# Patient Record
Sex: FEMALE | Race: BLACK OR AFRICAN AMERICAN | NOT HISPANIC OR LATINO | Employment: OTHER
[De-identification: names, ages, dates, MRNs, and addresses within clinical notes are randomized per-mention and may not be internally consistent; named-entity substitution may affect disease eponyms.]

---

## 2017-02-06 ENCOUNTER — RECORDS - HEALTHEAST (OUTPATIENT)
Dept: ADMINISTRATIVE | Facility: OTHER | Age: 67
End: 2017-02-06

## 2017-06-05 ENCOUNTER — OFFICE VISIT - HEALTHEAST (OUTPATIENT)
Dept: FAMILY MEDICINE | Facility: CLINIC | Age: 67
End: 2017-06-05

## 2017-06-05 ENCOUNTER — RECORDS - HEALTHEAST (OUTPATIENT)
Dept: MAMMOGRAPHY | Facility: CLINIC | Age: 67
End: 2017-06-05

## 2017-06-05 DIAGNOSIS — Z12.31 ENCOUNTER FOR SCREENING MAMMOGRAM FOR MALIGNANT NEOPLASM OF BREAST: ICD-10-CM

## 2017-06-05 DIAGNOSIS — J45.20: ICD-10-CM

## 2017-06-05 DIAGNOSIS — Z00.00 PHYSICAL EXAM: ICD-10-CM

## 2017-06-05 DIAGNOSIS — D50.9 IRON DEFICIENCY ANEMIA, UNSPECIFIED: ICD-10-CM

## 2017-06-05 RX ORDER — FERROUS SULFATE 325(65) MG
TABLET ORAL
Status: SHIPPED | COMMUNITY
Start: 2017-06-05 | End: 2021-09-21

## 2017-06-05 ASSESSMENT — MIFFLIN-ST. JEOR: SCORE: 1355.86

## 2017-06-06 ENCOUNTER — AMBULATORY - HEALTHEAST (OUTPATIENT)
Dept: FAMILY MEDICINE | Facility: CLINIC | Age: 67
End: 2017-06-06

## 2017-06-06 ENCOUNTER — COMMUNICATION - HEALTHEAST (OUTPATIENT)
Dept: FAMILY MEDICINE | Facility: CLINIC | Age: 67
End: 2017-06-06

## 2017-06-06 DIAGNOSIS — D64.9 ANEMIA: ICD-10-CM

## 2017-06-06 LAB
CHOLEST SERPL-MCNC: 157 MG/DL
FASTING STATUS PATIENT QL REPORTED: NO
HDLC SERPL-MCNC: 47 MG/DL
LDLC SERPL CALC-MCNC: 100 MG/DL
TRIGL SERPL-MCNC: 52 MG/DL

## 2017-06-19 ENCOUNTER — AMBULATORY - HEALTHEAST (OUTPATIENT)
Dept: LAB | Facility: CLINIC | Age: 67
End: 2017-06-19

## 2017-06-19 DIAGNOSIS — D64.9 ANEMIA: ICD-10-CM

## 2017-07-12 ENCOUNTER — AMBULATORY - HEALTHEAST (OUTPATIENT)
Dept: FAMILY MEDICINE | Facility: CLINIC | Age: 67
End: 2017-07-12

## 2017-07-12 DIAGNOSIS — Z13.9 SCREENING: ICD-10-CM

## 2017-07-13 ENCOUNTER — COMMUNICATION - HEALTHEAST (OUTPATIENT)
Dept: FAMILY MEDICINE | Facility: CLINIC | Age: 67
End: 2017-07-13

## 2018-04-17 ENCOUNTER — COMMUNICATION - HEALTHEAST (OUTPATIENT)
Dept: SCHEDULING | Facility: CLINIC | Age: 68
End: 2018-04-17

## 2018-08-28 ENCOUNTER — OFFICE VISIT - HEALTHEAST (OUTPATIENT)
Dept: FAMILY MEDICINE | Facility: CLINIC | Age: 68
End: 2018-08-28

## 2018-08-28 ENCOUNTER — AMBULATORY - HEALTHEAST (OUTPATIENT)
Dept: FAMILY MEDICINE | Facility: CLINIC | Age: 68
End: 2018-08-28

## 2018-08-28 DIAGNOSIS — L72.3 SEBACEOUS CYST: ICD-10-CM

## 2018-08-28 DIAGNOSIS — Z12.11 SCREEN FOR COLON CANCER: ICD-10-CM

## 2018-08-28 DIAGNOSIS — J45.20: ICD-10-CM

## 2018-08-28 ASSESSMENT — MIFFLIN-ST. JEOR: SCORE: 1328.64

## 2018-09-14 ENCOUNTER — COMMUNICATION - HEALTHEAST (OUTPATIENT)
Dept: ADMINISTRATIVE | Facility: CLINIC | Age: 68
End: 2018-09-14

## 2018-10-30 ENCOUNTER — RECORDS - HEALTHEAST (OUTPATIENT)
Dept: ADMINISTRATIVE | Facility: OTHER | Age: 68
End: 2018-10-30

## 2018-10-31 ENCOUNTER — RECORDS - HEALTHEAST (OUTPATIENT)
Dept: ADMINISTRATIVE | Facility: OTHER | Age: 68
End: 2018-10-31

## 2018-11-02 ENCOUNTER — RECORDS - HEALTHEAST (OUTPATIENT)
Dept: ADMINISTRATIVE | Facility: OTHER | Age: 68
End: 2018-11-02

## 2018-11-26 ENCOUNTER — OFFICE VISIT - HEALTHEAST (OUTPATIENT)
Dept: FAMILY MEDICINE | Facility: CLINIC | Age: 68
End: 2018-11-26

## 2018-11-26 DIAGNOSIS — D64.9 ANEMIA, UNSPECIFIED TYPE: ICD-10-CM

## 2018-11-26 DIAGNOSIS — K80.20 CALCULUS OF GALLBLADDER WITHOUT CHOLECYSTITIS WITHOUT OBSTRUCTION: ICD-10-CM

## 2018-11-26 DIAGNOSIS — E66.813 CLASS 3 SEVERE OBESITY IN ADULT, UNSPECIFIED BMI, UNSPECIFIED OBESITY TYPE, UNSPECIFIED WHETHER SERIOUS COMORBIDITY PRESENT (H): ICD-10-CM

## 2018-11-26 DIAGNOSIS — F79 UNSPECIFIED INTELLECTUAL DISABILITIES: ICD-10-CM

## 2018-11-26 DIAGNOSIS — R32 URINARY INCONTINENCE, UNSPECIFIED TYPE: ICD-10-CM

## 2018-11-26 DIAGNOSIS — E66.01 CLASS 3 SEVERE OBESITY IN ADULT, UNSPECIFIED BMI, UNSPECIFIED OBESITY TYPE, UNSPECIFIED WHETHER SERIOUS COMORBIDITY PRESENT (H): ICD-10-CM

## 2018-11-26 DIAGNOSIS — Z00.00 MEDICARE ANNUAL WELLNESS VISIT, SUBSEQUENT: ICD-10-CM

## 2018-11-26 LAB
ALBUMIN SERPL-MCNC: 3.2 G/DL (ref 3.5–5)
ALP SERPL-CCNC: 111 U/L (ref 45–120)
ALT SERPL W P-5'-P-CCNC: 11 U/L (ref 0–45)
ANION GAP SERPL CALCULATED.3IONS-SCNC: 10 MMOL/L (ref 5–18)
AST SERPL W P-5'-P-CCNC: 13 U/L (ref 0–40)
BILIRUB SERPL-MCNC: 0.5 MG/DL (ref 0–1)
BUN SERPL-MCNC: 17 MG/DL (ref 8–22)
CALCIUM SERPL-MCNC: 9.5 MG/DL (ref 8.5–10.5)
CHLORIDE BLD-SCNC: 105 MMOL/L (ref 98–107)
CHOLEST SERPL-MCNC: 169 MG/DL
CO2 SERPL-SCNC: 27 MMOL/L (ref 22–31)
CREAT SERPL-MCNC: 0.68 MG/DL (ref 0.6–1.1)
ERYTHROCYTE [DISTWIDTH] IN BLOOD BY AUTOMATED COUNT: 15 % (ref 11–14.5)
FASTING STATUS PATIENT QL REPORTED: NO
GFR SERPL CREATININE-BSD FRML MDRD: >60 ML/MIN/1.73M2
GLUCOSE BLD-MCNC: 101 MG/DL (ref 70–125)
HCT VFR BLD AUTO: 38.7 % (ref 35–47)
HDLC SERPL-MCNC: 52 MG/DL
HGB BLD-MCNC: 12.2 G/DL (ref 12–16)
LDLC SERPL CALC-MCNC: 103 MG/DL
MCH RBC QN AUTO: 24.1 PG (ref 27–34)
MCHC RBC AUTO-ENTMCNC: 31.5 G/DL (ref 32–36)
MCV RBC AUTO: 77 FL (ref 80–100)
PLATELET # BLD AUTO: 414 THOU/UL (ref 140–440)
PMV BLD AUTO: 6.9 FL (ref 7–10)
POTASSIUM BLD-SCNC: 4.4 MMOL/L (ref 3.5–5)
PROT SERPL-MCNC: 7.6 G/DL (ref 6–8)
RBC # BLD AUTO: 5.05 MILL/UL (ref 3.8–5.4)
SODIUM SERPL-SCNC: 142 MMOL/L (ref 136–145)
TRIGL SERPL-MCNC: 69 MG/DL
WBC: 5.3 THOU/UL (ref 4–11)

## 2018-11-26 ASSESSMENT — MIFFLIN-ST. JEOR: SCORE: 1328.64

## 2018-11-29 ENCOUNTER — COMMUNICATION - HEALTHEAST (OUTPATIENT)
Dept: FAMILY MEDICINE | Facility: CLINIC | Age: 68
End: 2018-11-29

## 2019-02-26 ENCOUNTER — COMMUNICATION - HEALTHEAST (OUTPATIENT)
Dept: FAMILY MEDICINE | Facility: CLINIC | Age: 69
End: 2019-02-26

## 2019-04-11 ENCOUNTER — COMMUNICATION - HEALTHEAST (OUTPATIENT)
Dept: GERIATRIC MEDICINE | Facility: CLINIC | Age: 69
End: 2019-04-11

## 2019-09-09 ENCOUNTER — COMMUNICATION - HEALTHEAST (OUTPATIENT)
Dept: GERIATRIC MEDICINE | Facility: CLINIC | Age: 69
End: 2019-09-09

## 2020-01-14 ENCOUNTER — COMMUNICATION - HEALTHEAST (OUTPATIENT)
Dept: GERIATRIC MEDICINE | Facility: CLINIC | Age: 70
End: 2020-01-14

## 2020-02-04 ENCOUNTER — COMMUNICATION - HEALTHEAST (OUTPATIENT)
Dept: GERIATRIC MEDICINE | Facility: CLINIC | Age: 70
End: 2020-02-04

## 2020-02-10 ENCOUNTER — COMMUNICATION - HEALTHEAST (OUTPATIENT)
Dept: GERIATRIC MEDICINE | Facility: CLINIC | Age: 70
End: 2020-02-10

## 2020-02-10 RX ORDER — ACETAMINOPHEN 325 MG/1
650 TABLET ORAL EVERY 6 HOURS PRN
Status: SHIPPED | COMMUNITY
Start: 2020-02-10 | End: 2021-09-21

## 2020-02-10 ASSESSMENT — ACTIVITIES OF DAILY LIVING (ADL): DEPENDENT_IADLS:: SHOPPING;MONEY MANAGEMENT

## 2020-02-24 ENCOUNTER — COMMUNICATION - HEALTHEAST (OUTPATIENT)
Dept: GERIATRIC MEDICINE | Facility: CLINIC | Age: 70
End: 2020-02-24

## 2020-03-19 ENCOUNTER — COMMUNICATION - HEALTHEAST (OUTPATIENT)
Dept: FAMILY MEDICINE | Facility: CLINIC | Age: 70
End: 2020-03-19

## 2020-06-16 ENCOUNTER — RECORDS - HEALTHEAST (OUTPATIENT)
Dept: ADMINISTRATIVE | Facility: OTHER | Age: 70
End: 2020-06-16

## 2020-07-21 ENCOUNTER — HOSPITAL ENCOUNTER (OUTPATIENT)
Dept: MAMMOGRAPHY | Facility: CLINIC | Age: 70
Discharge: HOME | End: 2020-07-21
Attending: FAMILY MEDICINE

## 2020-07-21 DIAGNOSIS — Z12.31 VISIT FOR SCREENING MAMMOGRAM: ICD-10-CM

## 2020-08-11 ENCOUNTER — COMMUNICATION - HEALTHEAST (OUTPATIENT)
Dept: GERIATRIC MEDICINE | Facility: CLINIC | Age: 70
End: 2020-08-11

## 2020-09-15 ENCOUNTER — OFFICE VISIT - HEALTHEAST (OUTPATIENT)
Dept: FAMILY MEDICINE | Facility: CLINIC | Age: 70
End: 2020-09-15

## 2020-09-15 DIAGNOSIS — E55.9 VITAMIN D DEFICIENCY: ICD-10-CM

## 2020-09-15 DIAGNOSIS — J45.20: ICD-10-CM

## 2020-09-15 DIAGNOSIS — E66.01 MORBID OBESITY (H): ICD-10-CM

## 2020-09-15 DIAGNOSIS — R73.01 ELEVATED FASTING GLUCOSE: ICD-10-CM

## 2020-09-15 DIAGNOSIS — D50.9 IRON DEFICIENCY ANEMIA, UNSPECIFIED IRON DEFICIENCY ANEMIA TYPE: ICD-10-CM

## 2020-09-15 DIAGNOSIS — Z23 IMMUNIZATION DUE: ICD-10-CM

## 2020-09-15 DIAGNOSIS — H90.3 SENSORINEURAL HEARING LOSS (SNHL) OF BOTH EARS: ICD-10-CM

## 2020-09-15 DIAGNOSIS — Z11.59 NEED FOR HEPATITIS C SCREENING TEST: ICD-10-CM

## 2020-09-15 DIAGNOSIS — Z78.0 MENOPAUSE: ICD-10-CM

## 2020-09-15 DIAGNOSIS — Z00.00 MEDICARE ANNUAL WELLNESS VISIT, SUBSEQUENT: ICD-10-CM

## 2020-09-15 LAB
ALBUMIN SERPL-MCNC: 3.6 G/DL (ref 3.5–5)
ALP SERPL-CCNC: 118 U/L (ref 45–120)
ALT SERPL W P-5'-P-CCNC: 12 U/L (ref 0–45)
ANION GAP SERPL CALCULATED.3IONS-SCNC: 11 MMOL/L (ref 5–18)
AST SERPL W P-5'-P-CCNC: 13 U/L (ref 0–40)
BILIRUB SERPL-MCNC: 0.5 MG/DL (ref 0–1)
BUN SERPL-MCNC: 11 MG/DL (ref 8–22)
CALCIUM SERPL-MCNC: 9 MG/DL (ref 8.5–10.5)
CHLORIDE BLD-SCNC: 103 MMOL/L (ref 98–107)
CO2 SERPL-SCNC: 26 MMOL/L (ref 22–31)
CREAT SERPL-MCNC: 0.68 MG/DL (ref 0.6–1.1)
ERYTHROCYTE [DISTWIDTH] IN BLOOD BY AUTOMATED COUNT: 15.2 % (ref 11–14.5)
GFR SERPL CREATININE-BSD FRML MDRD: >60 ML/MIN/1.73M2
GLUCOSE BLD-MCNC: 83 MG/DL (ref 70–125)
HBA1C MFR BLD: 6 %
HCT VFR BLD AUTO: 39 % (ref 35–47)
HGB BLD-MCNC: 12.3 G/DL (ref 12–16)
MCH RBC QN AUTO: 24 PG (ref 27–34)
MCHC RBC AUTO-ENTMCNC: 31.6 G/DL (ref 32–36)
MCV RBC AUTO: 76 FL (ref 80–100)
PLATELET # BLD AUTO: 385 THOU/UL (ref 140–440)
PMV BLD AUTO: 7 FL (ref 7–10)
POTASSIUM BLD-SCNC: 4.5 MMOL/L (ref 3.5–5)
PROT SERPL-MCNC: 8 G/DL (ref 6–8)
RBC # BLD AUTO: 5.14 MILL/UL (ref 3.8–5.4)
SODIUM SERPL-SCNC: 140 MMOL/L (ref 136–145)
WBC: 4.6 THOU/UL (ref 4–11)

## 2020-09-15 RX ORDER — OMEGA-3-ACID ETHYL ESTERS 1 G/1
2 CAPSULE, LIQUID FILLED ORAL 2 TIMES DAILY
Status: SHIPPED | COMMUNITY
Start: 2020-09-15 | End: 2021-09-21

## 2020-09-15 RX ORDER — ALBUTEROL SULFATE 90 UG/1
AEROSOL, METERED RESPIRATORY (INHALATION)
Qty: 8.5 G | Refills: 5 | Status: SHIPPED | OUTPATIENT
Start: 2020-09-15 | End: 2021-09-21

## 2020-09-15 ASSESSMENT — MIFFLIN-ST. JEOR: SCORE: 1291.79

## 2020-09-16 ENCOUNTER — AMBULATORY - HEALTHEAST (OUTPATIENT)
Dept: OTHER | Facility: CLINIC | Age: 70
End: 2020-09-16

## 2020-09-16 LAB
25(OH)D3 SERPL-MCNC: 25 NG/ML (ref 30–80)
HCV AB SERPL QL IA: NEGATIVE

## 2020-09-17 ENCOUNTER — COMMUNICATION - HEALTHEAST (OUTPATIENT)
Dept: FAMILY MEDICINE | Facility: CLINIC | Age: 70
End: 2020-09-17

## 2020-09-17 ENCOUNTER — AMBULATORY - HEALTHEAST (OUTPATIENT)
Dept: FAMILY MEDICINE | Facility: CLINIC | Age: 70
End: 2020-09-17

## 2020-09-17 DIAGNOSIS — E55.9 VITAMIN D DEFICIENCY: ICD-10-CM

## 2020-09-23 ENCOUNTER — COMMUNICATION - HEALTHEAST (OUTPATIENT)
Dept: FAMILY MEDICINE | Facility: CLINIC | Age: 70
End: 2020-09-23

## 2020-09-29 ENCOUNTER — RECORDS - HEALTHEAST (OUTPATIENT)
Dept: ADMINISTRATIVE | Facility: OTHER | Age: 70
End: 2020-09-29

## 2020-10-14 ENCOUNTER — COMMUNICATION - HEALTHEAST (OUTPATIENT)
Dept: SCHEDULING | Facility: CLINIC | Age: 70
End: 2020-10-14

## 2020-10-20 ENCOUNTER — RECORDS - HEALTHEAST (OUTPATIENT)
Dept: ADMINISTRATIVE | Facility: OTHER | Age: 70
End: 2020-10-20

## 2020-10-20 ENCOUNTER — RECORDS - HEALTHEAST (OUTPATIENT)
Dept: BONE DENSITY | Facility: CLINIC | Age: 70
End: 2020-10-20

## 2020-10-20 DIAGNOSIS — Z78.0 ASYMPTOMATIC MENOPAUSAL STATE: ICD-10-CM

## 2020-10-26 ENCOUNTER — COMMUNICATION - HEALTHEAST (OUTPATIENT)
Dept: FAMILY MEDICINE | Facility: CLINIC | Age: 70
End: 2020-10-26

## 2020-12-16 ENCOUNTER — COMMUNICATION - HEALTHEAST (OUTPATIENT)
Dept: FAMILY MEDICINE | Facility: CLINIC | Age: 70
End: 2020-12-16

## 2020-12-22 ENCOUNTER — AMBULATORY - HEALTHEAST (OUTPATIENT)
Dept: NURSING | Facility: CLINIC | Age: 70
End: 2020-12-22

## 2021-01-18 ENCOUNTER — COMMUNICATION - HEALTHEAST (OUTPATIENT)
Dept: GERIATRIC MEDICINE | Facility: CLINIC | Age: 71
End: 2021-01-18

## 2021-01-25 ENCOUNTER — COMMUNICATION - HEALTHEAST (OUTPATIENT)
Dept: GERIATRIC MEDICINE | Facility: CLINIC | Age: 71
End: 2021-01-25

## 2021-03-03 ENCOUNTER — RECORDS - HEALTHEAST (OUTPATIENT)
Dept: FAMILY MEDICINE | Facility: CLINIC | Age: 71
End: 2021-03-03

## 2021-03-03 DIAGNOSIS — Z12.31 VISIT FOR SCREENING MAMMOGRAM: ICD-10-CM

## 2021-03-16 ENCOUNTER — OFFICE VISIT - HEALTHEAST (OUTPATIENT)
Dept: FAMILY MEDICINE | Facility: CLINIC | Age: 71
End: 2021-03-16

## 2021-03-16 DIAGNOSIS — F79 UNSPECIFIED INTELLECTUAL DISABILITIES: ICD-10-CM

## 2021-03-16 DIAGNOSIS — R07.89 ATYPICAL CHEST PAIN: ICD-10-CM

## 2021-03-16 LAB
ATRIAL RATE - MUSE: 72 BPM
DIASTOLIC BLOOD PRESSURE - MUSE: NORMAL
INTERPRETATION ECG - MUSE: NORMAL
P AXIS - MUSE: 30 DEGREES
PR INTERVAL - MUSE: 182 MS
QRS DURATION - MUSE: 80 MS
QT - MUSE: 370 MS
QTC - MUSE: 405 MS
R AXIS - MUSE: 67 DEGREES
SYSTOLIC BLOOD PRESSURE - MUSE: NORMAL
T AXIS - MUSE: 31 DEGREES
VENTRICULAR RATE- MUSE: 72 BPM

## 2021-03-23 ENCOUNTER — AMBULATORY - HEALTHEAST (OUTPATIENT)
Dept: NURSING | Facility: CLINIC | Age: 71
End: 2021-03-23

## 2021-03-30 ENCOUNTER — COMMUNICATION - HEALTHEAST (OUTPATIENT)
Dept: ADMINISTRATIVE | Facility: CLINIC | Age: 71
End: 2021-03-30

## 2021-04-12 ENCOUNTER — AMBULATORY - HEALTHEAST (OUTPATIENT)
Dept: NURSING | Facility: CLINIC | Age: 71
End: 2021-04-12

## 2021-05-03 ENCOUNTER — AMBULATORY - HEALTHEAST (OUTPATIENT)
Dept: NURSING | Facility: CLINIC | Age: 71
End: 2021-05-03

## 2021-05-27 NOTE — PROGRESS NOTES
[facility] Care Coordination Contact      Member became effective with  [facility] on 4/1/19 with Dahlia MSC+.  Previous Health Plan: N/A  Previous Care System: MA Termed.  Previously with .  Previous care coordinators name and number: Nadine Higgins Type: DD  Last MMIS Entry: Date 09/25/18 and Type 02 PERS ASSMT  MMIS visit date if different from above: n/a    UTF received: No: MA Termed.  Previously with .    Anjali Raines  Care Management Specialist  [facility]  [phone]

## 2021-05-28 ASSESSMENT — ASTHMA QUESTIONNAIRES: ACT_TOTALSCORE: 16

## 2021-05-28 NOTE — PROGRESS NOTES
[facility] Care Coordination Contact    4/23/2019:   Member was on our enrollment previously and had HRA completed on 9/25/18. Her MA termed on 11/30/18.   LM for Blanca Olmstead, guardian at [facility] informing her due to the MA lapse member would be due for another HRA.   Requested call back.     TC to member's group home. They stated that member is doing well and hasn't had any major changes. They are fine with me coming and visiting, but would like me to speak to the guardian first.     Nadine Higgins, [facility]  [phone]

## 2021-05-30 VITALS — WEIGHT: 202 LBS | BODY MASS INDEX: 40.72 KG/M2 | HEIGHT: 59 IN

## 2021-06-01 VITALS — BODY MASS INDEX: 39.51 KG/M2 | WEIGHT: 196 LBS | HEIGHT: 59 IN

## 2021-06-01 NOTE — PROGRESS NOTES
[facility] Care Coordination Contact    No longer active with [facility] community case management effective 05/31/2019.  Reason for community disenrollment: BALJINDER Abebe  [facility]  [phone]

## 2021-06-02 ENCOUNTER — RECORDS - HEALTHEAST (OUTPATIENT)
Dept: ADMINISTRATIVE | Facility: CLINIC | Age: 71
End: 2021-06-02

## 2021-06-02 VITALS — BODY MASS INDEX: 39.51 KG/M2 | WEIGHT: 196 LBS | HEIGHT: 59 IN

## 2021-06-04 VITALS
TEMPERATURE: 98.5 F | BODY MASS INDEX: 40.35 KG/M2 | DIASTOLIC BLOOD PRESSURE: 76 MMHG | HEART RATE: 73 BPM | WEIGHT: 192.25 LBS | OXYGEN SATURATION: 94 % | HEIGHT: 58 IN | SYSTOLIC BLOOD PRESSURE: 140 MMHG

## 2021-06-05 VITALS
TEMPERATURE: 98.1 F | OXYGEN SATURATION: 98 % | HEART RATE: 56 BPM | DIASTOLIC BLOOD PRESSURE: 80 MMHG | WEIGHT: 187.31 LBS | BODY MASS INDEX: 39.15 KG/M2 | SYSTOLIC BLOOD PRESSURE: 132 MMHG

## 2021-06-05 NOTE — PROGRESS NOTES
[facility] Care Coordination Contact    TC from Mona Alba's ILS worker [phone]. They were going through the mail and saw my UTR letter.   Mona is open to a visit, but would prefer it to be at a time when Anjali is also present.   Anjali comes every Monday and Tuesday from 9-12.   Mona reported that she stopped going to [facility] and has been going to [facility]. She is looking for a new provider now though and isn't sure where she will go.   We scheduled a visit for Monday at 9:30am.      BALJINDER Gonsales  [facility]  [phone]

## 2021-06-05 NOTE — PROGRESS NOTES
[facility] Care Coordination Contact    Attempted to call Mona on 1/13 and again on 1/14 and left messages. No response.   Emergency contact on file with Epic is no longer an active number.     TC to [facility] DD . She stated that Mona can be very difficult to reach and rarely answers her phone. Mona is pretty healthy and may not want care coordination involved.   She is on the DD waiver, but due to some screening entry issues has not been updated in MMIS.   She provided me updated information on her guardian from [facility] [phone].     Nadine Higgins, [facility]  [phone]

## 2021-06-05 NOTE — PROGRESS NOTES
"[facility] Care Coordination Contact    Per CC, mailed client an \"Unable to Contact\" letter.    Per CC, continues to be unable to contact member for assessment.    Anjali Raines  Care Management Specialist  [facility]  [phone]    "

## 2021-06-05 NOTE — PROGRESS NOTES
[facility] Care Coordination Contact    Member became effective with [facility] on 01/01/2020 with Dahlia MSC+.  Previous Health Plan: MA/Fee For Service  Previous Care System: N/A  Previous care coordinators name and number: Nadine Higgins   [place] Type: DD  Last MMIS Entry: Date 09/25/18 and Type 02 PERS ASSMT  UTF received: No: Requested on No other Assessment on file.    Anjali Raines  Care Management Specialist  [facility]  [phone]

## 2021-06-05 NOTE — PROGRESS NOTES
[facility] Care Coordination Contact    Left message for Mona to call back. I explained my role.     Left message for Mona's guardian Priscila informing her of my role and inability to contact Mona.     Requested UTR letter mailed.     Nadine Higgins [facility]  [phone]

## 2021-06-06 NOTE — PROGRESS NOTES
[facility] Care Coordination Contact    Received after visit chart from care coordinator.  Completed following tasks: Mailed copy of care plan to client.  Updated services in access as needed.     Provider Signature - No POC Shared:  Member indicates that they do not want their POC shared with any EW providers.    Anjali Raines  Care Management Specialist  [facility]  [phone]

## 2021-06-06 NOTE — PROGRESS NOTES
[facility] Care Coordination Contact  [facility] Home Visit Assessment     Home visit for Health Risk Assessment with Mona Barnett completed on 2/10/2020.    Type of residence:: Apartment  Current living arrangement:: I live alone     Assessment completed with:: Patient, Care Team Member, Other(ILS worker)    Current Care Plan  Member currently receiving the following home care services: None   Member currently receiving the following community resources: Mona is on the DD waiver and receives ILS services and transportation through that program. Her sister would like her to receive [employer]. I informed her that would inform her DD worker of that request.     Medication Review  Medication reconciliation completed in Epic: YES  Medication set-up & administration: Independent-does not set up  Self-administers medications  Medication Risk Assessment Medication (1 or more, place referral to MTM):  N/A: No risk factors identified  MTM Referral Placed: No: No risk factors idenified    Mental/Behavioral Health   Depression Screening: See PHQ assessment flowsheet.   Mental health DX:: Yes   Mental health DX how managed:: None  Mental Health Diagnosis: Yes: F32.3  Mental Health Services: None: No further intervention needed at this time.    Falls Assessment:   Fallen 2 or more times in the past year?: No   Any fall with injury in the past year?: No    ADL/IADL Dependencies:   Dependent ADLs:: Independent  Dependent IADLs:: Shopping, Money Management    [facility] sponsored benefits: Shared information re: Silver Sneakers/gym memberships, ASA, Calcium +D.    PCA Assessment completed at visit: N/A    Elderly Waiver Eligibility: Mona is on the DD waiver and not eligible for EW.    Care Plan & Recommendations:   Met with Mnoa and her ILS worker Rufus Alba's last day with Mona is tomorrow and they are currently working on finding a new ILS worker.   The ILS workers help coordinate Mona's medical care and  accompany her to her appointments. Mona is currently looking for a new primary physician. She is interested in [facility].  She is open to having a wellness exam and getting updated on her preventative cares.     See [facility] for detailed assessment information.    Follow-Up Plan: Member informed of future contact, plan to f/u with member with a 6 month telephone assessment.  Contact information shared with member and family, encouraged member to call with any questions or concerns at any time.    Nadine Higgins, [facility]  [phone]

## 2021-06-07 NOTE — TELEPHONE ENCOUNTER
Left message to call back for: Patient  Information to relay to patient: Due to COVID19 we are taking precautions and canceling all appointments at this time. We are trying to limit possible exposure coming in and out of the clinic. Please help patient r/s their appointment for the summer until further notice.     NR/CMA

## 2021-06-10 NOTE — PROGRESS NOTES
[facility] Care Coordination Contact    Called member to complete six month assessment and left a message requesting a return call.    Secure e-mailed Mona's ILS agency ([employer])  and asked for an update. I inquired on wether the ILS worker could assist Mona with calling me at their next visit.     Nadine Higgins, [facility]  [phone]

## 2021-06-10 NOTE — PROGRESS NOTES
[facility] Care Coordination Contact    Received message from Mona's ILS worker Guillermina ([phone]).  She is with Mona on Mondays and Tuesdays from 9-1, so suggested I call during that time.     LM for Guillermina informing her that I would call next Monday morning.     Nadine Higgins [facility]  [phone]

## 2021-06-11 NOTE — PROGRESS NOTES
Assessment and Plan:     1. Medicare annual wellness visit, subsequent      2. Intermittent asthma with reliever use up to twice per week, uncomplicated  Asthma control test and Asthma Action Plan done   - albuterol (PROAIR HFA) 90 mcg/actuation inhaler; INHALE 2 PUFFS BY MOUTH EVERY 4 HOURS AS NEEDED  Dispense: 8.5 g; Refill: 5    3. Iron deficiency anemia, unspecified iron deficiency anemia type  Monitor with   - HM2(CBC w/o Differential)    4. Sensorineural hearing loss (SNHL) of both ears  Has hearing aids - question whether they are working correctly  - Ambulatory referral to Audiology    5. Elevated fasting glucose  evaluate with   - Glycosylated Hemoglobin A1c  - Comprehensive Metabolic Panel    6. Morbid obesity (H)  She is working on exercise with her ILS worker - this continues to be important    7. Vitamin D deficiency  Check level  - Vitamin D, Total (25-Hydroxy)    8. Immunization due  - Influenza,Quad,High Dose,PF 65 YR+    9. Menopause  Screen for osteopenia/osteoporosis with  - DXA Bone Density Scan; Standing    10. Need for hepatitis C screening test  Recommended screening for baby boomers  - Hepatitis C Antibody (Anti-HCV)      The patient's current medical problems were reviewed.      The following health maintenance schedule was reviewed with the patient and provided in printed form in the after visit summary:   Health Maintenance   Topic Date Due     HEPATITIS C SCREENING  1950     ZOSTER VACCINES (1 of 2) 10/22/2002     DXA SCAN  10/22/2017     Asthma Control Test  09/15/2021     MEDICARE ANNUAL WELLNESS VISIT  09/15/2021     PNEUMOCOCCAL IMMUNIZATION 65+ LOW/MEDIUM RISK (2 of 2 - PCV13) 09/15/2021     FALL RISK ASSESSMENT  09/15/2021     ASTHMA ACTION PLAN  09/16/2021     MAMMOGRAM  07/21/2022     LIPID  11/26/2023     ADVANCE CARE PLANNING  09/16/2025     COLORECTAL CANCER SCREENING  10/31/2028     TD 18+ HE  09/15/2030     INFLUENZA VACCINE RULE BASED  Completed     HEPATITIS B  "VACCINES  Aged Out     - hepatitis C test done today   - she or ILS will have to look into coverage of shingles vaccine    Subjective:   Chief Complaint: Mona Barnett is an 69 y.o. female here for an Annual Wellness visit.   HPI:      Mona is a new patient to me - she used to see Dr. Monroy, who is no longer at this clinic.    She has a diagnosis of \"intellectual disabilities.\" She lives in a home on her own, but has an [facility] worker - Guillermina, who comes with her today . Guillermina comes on Mondays and Tuesdays - goal of exercising - they do go walking around [facility] or [place]. Mona does not prepare meals for  herself at home.  Guillermina also help clean house. Mona says her neighbors are nice and her sister Sanchez lives in [place] and is also supportive. Guillermina just started seeing her at the end or March or April    Mona works in the [employer] at [employer].  She notes slipping and falling on a wet floor, no injury.  Guillermina mentions support staff that follow her for employment    Mona has hearing loss, wears hearing aids, but still seems to have trouble hearing. Guillermina is requesting a referral to [facility] in [place] for an evaluation, as Mona has been seen there before    WE went over her problem list    Intermittent Asthma - not on her problem list but she dos have albuterol prescribe with diagnosis of intermittent asthma.  Mona says this is helpful when they go for longer walks and she is short of breath    Iron deficiency anemia- she take iron supplementation; She does not take a daily multivitamin   Source of deficiency is not listed   Lab Results   Component Value Date    HGB 12.3 09/15/2020    since her hemoglobin is in normal range ON supplementation, discussed she should continue this    Macular Degeneration- Guillermina mentions this recent diagnosis - we do not have notes from her eye doctor - will need these before adding to problem list. Mona is taking gummies - fish oil or omega 3 " and also using off -brand wetting eye drops  On dry eye drops - off brand     Other: urinary incontinence, hidradenitis suppurativa - not mentioned/discussed today     needs to update contacts    Review of Systems:   Please see above.  The rest of the review of systems are negative for all systems.          Patient Care Team:  Anjali Navarro MD as PCP - General (Family Medicine)  Roderick Monroy MD as Assigned PCP  Nadine Higgins SW as Lead Care Coordinator (Primary Care - CC)     Patient Active Problem List   Diagnosis     Cholelithiasis     Feelings Of Urinary Urgency     Iron Deficiency     Limb Pain     Intellectual Disabilities     Hearing Loss     Hidradenitis Suppurativa     Urinary incontinence     Morbid obesity (H)     Past Medical History:   Diagnosis Date     Cholelithiasis      Hearing loss      Hidradenitis suppurativa      Iron deficiency      Limb pain      Mild intellectual disabilities (CODE)       Past Surgical History:   Procedure Laterality Date     BREAST CYST EXCISION Right     Had cyst removed many years ago     ND REMOVAL GALLBLADDER      Description: Cholecystectomy;  Recorded: 04/15/2009;     ND TOTAL ABDOM HYSTERECTOMY      Description: Hysterectomy;  Recorded: 04/15/2009;      Family History   Problem Relation Age of Onset     Breast cancer Neg Hx           Tobacco Use      Smoking status: Never Smoker      Smokeless tobacco: Never Used   reports no history of alcohol use.    Current Outpatient Medications   Medication Sig Dispense Refill     acetaminophen (TYLENOL) 325 MG tablet Take 650 mg by mouth every 6 (six) hours as needed for pain.       albuterol (PROAIR HFA) 90 mcg/actuation inhaler INHALE 2 PUFFS BY MOUTH EVERY 4 HOURS AS NEEDED 8.5 g 5     ferrous sulfate 325 (65 FE) MG tablet take 1 tablet (325 mg) by oral route 3 times per day       omega-3 acid ethyl esters (LOVAZA) 1 gram capsule Take 2 g by mouth 2 (two) times a day.       No current  "facility-administered medications for this visit.       Objective:   Vital Signs:   Visit Vitals  /76 (Patient Site: Right Arm, Patient Position: Sitting, Cuff Size: Adult Regular)   Pulse 73   Temp 98.5  F (36.9  C) (Oral)   Ht 4' 10\" (1.473 m)   Wt 192 lb 4 oz (87.2 kg)   SpO2 94%   BMI 40.18 kg/m           VisionScreening:  No exam data present     PHYSICAL EXAM  General appearance - alert, well appearing, and in no distress; obese; wears bilateral hearing aids  Mental status - very pleasant - normal mood, behavior, speech, dress, motor activity, and thought processes  Eyes - pupils equal and reactive, extraocular eye movements intact, funduscopic exam normal, discs flat and sharp  Ears - Left TM's and external ear canals normal; right side occluded with wax  Mouth - mucous membranes moist, pharynx normal without lesions  Neck - supple, no significant adenopathy, carotids upstroke normal bilaterally, no bruits, thyroid exam: thyroid is normal in size without nodules or tenderness  Chest - clear to auscultation, no wheezes, rales or rhonchi, symmetric air entry  Heart - normal rate, regular rhythm, normal S1, S2, no murmurs, rubs, clicks or gallops  Abdomen - soft, nontender, nondistended, no masses or organomegaly  Breasts - breasts appear normal, no suspicious masses, no skin or nipple changes or axillary nodes  Neurological - alert, oriented, normal speech, no focal findings or movement disorder noted, DTR's normal and symmetric  Extremities - peripheral pulses normal, no pedal edema, no clubbing or cyanosis; fungal changes on most toenails  Skin - no rashes or worrisome lesions          Assessment Results 9/15/2020   Activities of Daily Living No help needed   Instrumental Activities of Daily Living 2-4 - Moderate impairment   Get Up and Go Score 12 seconds or more   Mini Cog Total Score 0   Some recent data might be hidden     A Mini-Cog score of 0-2 suggests the possibility of dementia, score of 3-5 " suggests no dementia    Mona give permission to fax copy of progress notes and labs to :     FAX: [phone]  Attn Guillermina      Identified Health Risks:     She is at risk for lack of exercise and has been provided with information to increase physical activity for the benefit of her well-being.  The patient was provided with written information regarding signs of hearing loss.  Information on urinary incontinence and treatment options given to patient.  She is at risk for falling and has been provided with information to reduce the risk of falling at home.  Information regarding advance directives (living flores), including where she can download the appropriate form, was provided to the patient via the AVS.       The patient was provided with appropriate referrals to address her memory problem.

## 2021-06-11 NOTE — PROGRESS NOTES
Assessment:      Healthy female exam.    History of anemia  Status post hysterectomy and bilateral oophorectomy  Obesity  Vitamin D deficiency  Reactive airway disease     Plan:       Routine lab work will be done today.  Pap smear is not required as patient is status post hysterectomy.  Mammogram will be updated today.  Patient will have Hemoccults done as well.  She was instructed in use of this.  She states she has had a colonoscopy but does not remember when.  We do not have a record of that.  Albuterol inhaler will be refilled for her.  Hemoglobin will be followed due to patient's history of anemia.  She will try to become more aerobically active and start a weight loss type diet.  I instructed her in the importance of losing weight.     Subjective:      Mona Barnett is a 66 y.o. female who presents for an annual exam. The patient is not sexually active. The patient participates in regular exercise: no. The patient reports that there is not domestic violence in her life.  Overall, she feels well.  She does not follow any specific diet.  She does not exercise or walk around much.  She will get some swelling in her legs occasionally.  She denies significant pain in the joints or back.  She thinks she had a colonoscopy years ago but does not remember when.  Her last mammogram was 1 year ago.  She is due for that.    Healthy Habits:   Regular Exercise: No  Sunscreen Use: No  Healthy Diet: No  Dental Visits Regularly: Yes  Seat Belt: Yes  Sexually active: No  Self Breast Exam Monthly:No  Hemoccults: Yes  Flex Sig: No  Colonoscopy: No  Lipid Profile: Yes  Glucose Screen: Yes      Immunization History   Administered Date(s) Administered     Hep A, historic 01/27/2010, 02/15/2011     Influenza [id number], 01/27/2010     Influenza, seasonal,quad inj 6-35 mos 01/27/2010     Tdap 01/06/2009     Immunization status: up to date and documented.    No exam data present    Gynecologic History  No LMP recorded. Patient has had a  hysterectomy.  Last mammogram: May 2015. Results were: normal      OB History    Para Term  AB Living     0      SAB TAB Ectopic Multiple Live Births                    Current Outpatient Prescriptions   Medication Sig Dispense Refill     ferrous sulfate 325 (65 FE) MG tablet take 1 tablet (325 mg) by oral route 3 times per day       albuterol (PROAIR HFA) 90 mcg/actuation inhaler INHALE 2 PUFFS BY MOUTH EVERY 4 HOURS AS NEEDED 8.5 g 0     cholecalciferol, vitamin D3, (VITAMIN D3) 4,000 unit cap Take 1 tab daily 90 capsule 3     No current facility-administered medications for this visit.      No past medical history on file.  Past Surgical History:   Procedure Laterality Date     BREAST CYST EXCISION Right     Had cyst removed many years ago     MA REMOVAL GALLBLADDER      Description: Cholecystectomy;  Recorded: 04/15/2009;     MA TOTAL ABDOM HYSTERECTOMY      Description: Hysterectomy;  Recorded: 04/15/2009;     Review of patient's allergies indicates no known allergies.  Family History   Problem Relation Age of Onset     Breast cancer Neg Hx      Social History     Social History     Marital status: Single     Spouse name: N/A     Number of children: N/A     Years of education: N/A     Occupational History     Not on file.     Social History Main Topics     Smoking status: Never Smoker     Smokeless tobacco: Never Used     Alcohol use No     Drug use: No     Sexual activity: No     Other Topics Concern     Not on file     Social History Narrative       Review of Systems  Review of Systems   Constitutional: Negative.  Negative for fatigue and fever.   HENT: Negative.  Negative for congestion.    Eyes: Negative.    Respiratory: Negative.  Negative for cough and shortness of breath.    Cardiovascular: Negative.  Negative for chest pain.   Gastrointestinal: Positive for constipation.   Endocrine: Negative.  Negative for polydipsia and polyuria.   Genitourinary: Negative.  Negative for dysuria and  "frequency.   Musculoskeletal: Negative.    Skin: Negative.    Allergic/Immunologic: Negative.    Neurological: Negative.  Negative for headaches.   Hematological: Negative.    Psychiatric/Behavioral: Negative.              Objective:         Vitals:    06/05/17 1910   BP: 132/70   Pulse: 80   Resp: 16   Temp: 97.9  F (36.6  C)   TempSrc: Oral   Weight: 202 lb (91.6 kg)   Height: 4' 11.25\" (1.505 m)     Body mass index is 40.46 kg/(m^2).    Physical  Physical Exam   Constitutional: She is oriented to person, place, and time. She appears well-developed and well-nourished. No distress.   HENT:   Right Ear: External ear normal.   Left Ear: External ear normal.   Nose: Nose normal.   Mouth/Throat: Oropharynx is clear and moist.   Eyes: Conjunctivae and EOM are normal. Pupils are equal, round, and reactive to light.   Neck: Normal range of motion. Neck supple. No thyromegaly present.   Cardiovascular: Normal rate, regular rhythm and normal heart sounds.    No murmur heard.  Pulmonary/Chest: Effort normal and breath sounds normal. She has no wheezes. She has no rales.   Abdominal: Soft. Bowel sounds are normal. She exhibits no mass. There is no tenderness.   Abdomen is obese   Musculoskeletal: Normal range of motion. She exhibits no edema or tenderness.   Lymphadenopathy:     She has no cervical adenopathy.   Neurological: She is alert and oriented to person, place, and time. She has normal reflexes. No cranial nerve deficit.   Skin: Skin is warm and dry. No rash noted.   Psychiatric: She has a normal mood and affect.          "

## 2021-06-11 NOTE — PROGRESS NOTES
[facility] Care Coordination Contact      [facility] Six-Month Telephone Assessment    6 month telephone assessment completed on 08/31/20 with help of [place] ILS worker Guillermina.    ER visits: Yes -  Urgency room [place]   Hospitalizations: No  TCU stays: No  Significant health status changes: None  Falls/Injuries: No  ADL/IADL changes: No  Changes in services: No    Caregiver Assessment follow up:  N/A    Goals: See POC in chart for goal progress documentation.      Will see member in 6 months for an annual health risk assessment.   Encouraged member to call CC with any questions or concerns in the meantime.     BALJINDER Gonsales  [facility]  [phone]

## 2021-06-11 NOTE — TELEPHONE ENCOUNTER
[facility] called to say Guillermina from pt Assisted living?  Was upset because they had not received the notes from pt Wellness exam last week.  Fax number given as [phone].  Faxing notes now.  Thanks.

## 2021-06-12 NOTE — TELEPHONE ENCOUNTER
Caller is patient's Independent tLIving Counselor who supervises patients health appointments and care   Requesting to schedule shingles vaccine if patient has not had it; EMR does not reveal she has  had it   Inquiring about colonoscopy history and advised had  in 2018  Concerns for pap tests; advised that patient has had total abdominal hysterectomy per history and is over age 65 an it is no longer required  Transferred to  to schedule nurse only for shingles vaccine    Ny Prajapati RN  FNA    Additional Information    Health Information question, no triage required and triager able to answer question    Protocols used: INFORMATION ONLY CALL-A-AH

## 2021-06-13 NOTE — TELEPHONE ENCOUNTER
"Thank you - Order placed - please make sure she has checked coverage and is \"allowed\" to get it at a clinic vs. Pharmacy   Depending on her insurance coverage.  Medicare alone covers under prescriptions, so that means pharmacy.  thanks  "

## 2021-06-13 NOTE — TELEPHONE ENCOUNTER
Patient to receive first Shingrix vaccine at [facility] clinic 12/22/20.      Will Provider place order if indicated?  Thank you.

## 2021-06-14 NOTE — PROGRESS NOTES
[facility] Care Coordination Contact  [facility] CCDB Assessment   (for members on other waivers)    Home visit for Health Risk Assessment with Mona Barnett completed on 1/18/2021.   Visit completed via phone due to COVID-19.      Type of residence:: Apartment - handicap accessible  Current living arrangement:: I live alone      Assessment completed with: Patient, Care Team Member, Other(ILS worker Guillermina)     Current Care Plan  Member currently receiving the following home care services:  N/A   Member currently receiving the following community resources: Transportation Services, Meals on Wheels, Lifeline , ILS services (all services provided through DD waiver.        Medication Review  Medication reconciliation completed in Epic: YES  Medication set-up & administration: Independent-does not set up  Self-administers medications- ILS worker stated that the 2 days she is with Mona she encourages her to take medication.   Medication Risk Assessment Medication (1 or more, place referral to MTM):  N/A: No risk factors identified  MTM Referral Placed: No: No risk factors idenified    Mental/Behavioral Health   PHQ-2 Total Score: 0       Mental health DX:: Yes   Mental health DX how managed:: None    Falls Assessment:   Fallen 2 or more times in the past year?: No   Any fall with injury in the past year?: No     ADL/IADL Dependencies:   Dependent ADLs:: Independent  Dependent IADLs:: Cleaning, Cooking, Shopping, Money Management, Transportation     [facility] sponsored benefits: Shared information re: Silver Sneakers/gym memberships, ASA, Calcium +D.     PCA Assessment completed at visit: Not Applicable       Elderly Waiver Eligibility: No - does not meet criteria- Currently on DD waiver      Care Plan &Recommendations: Mona stated that her health has been stable over the past year. Her only concern was with some chest pain she has been experiencing at times. She plans to address that with her PCP  at her appointment next month.   Mona continues to work at [employer] and goes to the gym weekly with her ILS worker. She tries to stay active and busy.      DD Care Plan/ISP requested from waiver .      Follow-Up Plan: Member informed of future contact, plan to f/u with member with a 6 month telephone assessment.  Contact information shared with member and family, encouraged member to call with any questions or concerns at any time.     Nadine Higgins, [facility]  [phone]

## 2021-06-14 NOTE — PROGRESS NOTES
[facility] Care Coordination Contact    Mailed copy of care plan to client.  Updated services in access as needed.     Provider Signature - No POC Shared:  Member indicates that they do not want their POC shared with any [facility] providers.    Mailed POC Signature page to member w/ self-stamped envelope for return.    Anjali Raines  Care Management Specialist  [facility]  [phone]

## 2021-06-15 NOTE — PROGRESS NOTES
Assessment and Plan    1. Atypical chest pain - hard for her to quantify or qualify  - Electrocardiogram Perform and Read - normal   - discussed probably musculoskeletal.  However urged that if she has severe left sided chest pain, use her emergency call necklace, and if she has recurrent pain we need to re-evaluate    2. Intellectual Disabilities  Complex issues around guardianship - see letter below    - AMB REFERRAL TO MENTAL HEALTH AND ADDICTION  - Adult (18+); Assessment and Testing; Neuropsychological Testing; [facility] [phone]; We will contact you to schedule the appointment or please call with any questions    To whom it may concern:     I am the Primary Care Provider for the above named patient.  I have seen Ms. Barnett twice first on the day she establish care with me at a Medicare Annual Wellness visit, and today.  [facility], and I have access to her records and notes of her previous PCP Dr. Monroy.  She is generally healthy, he main other issues being hearing  loss, mild intermittent asthma, and intellectual disabilities - the latter is the diagnosis on her chart  My understanding is that consideration is being given as to whether Mona can be her own guardian,  but continue to have support as needed form ILS worker and Rep Payee and other personnel  In ways, Mona IS independent: she lives on her own, takes public transportation to work, uses cash as needed.  She has Rep Payee for bills and an ILS worker who helps her with cleaning and taking to appointments.    On the other hand, I am worried about her vulnerability  o She is a poor historian, at least for medical issues  o She does not give descriptions with any depth of detail  o I have observed -  and her ILS worker also notes - that she may nod or give short answers to questions she may not understand  o Her ILS worker says she may not answer emails or  "phone calls.    o She has exhibited some poor judgment (her ILS tells me she put her cat in the icebox, where the creature )   I would be very concerned that in either an Emergency situation or a situation where someone might try to take disadvantage of her, if her judgement is poor and her ability to express herself poor, there would be no one who could legally help her/step in for her.  I am not familiar with the nuances of the law/consideration/recommendation around the issues of guardianship/conservatorship which is why I have specifically chosen NOT to make a firm recommendation.  However I do support further neuropsychological evaluation, and will put in a referral for her to have this               Return in about 6 months (around 2021) for Annual physical, or earlier as needed.    Anjali Navarro MD     -------------------------------------------    Chief Complaint   Patient presents with     Follow-up     Mona comes with her ILS Guillermina today .  Mona has been saying that she has pain in her breasts.  I ask Mona more about this.  It is difficult for her to describe.  She notes her breasts, sometimes points to both side, sometimes to one.  She sometimes as \"heavy\" pain in her chest at night .  Chest pain never occurs when she is active. She also feel some pain in her epigastric area when she bends over.  She cannot ID any triggers    She also says she feels dizzy sometimes at work - hard for her to quantify or qualify  ---    Guillermina also brings papers for me to sign. One is a summary of today's visit.  The other is a document regarding guardianship where I am asked to check a box stating that I do or do not think she needs a guardian.  Guillermina says Mona's last evaluation (for competency?) was in     Guillermina explains that the State is re-examining need for guardians vs. Letting some vulnerable adults be their own guardians. Guillermina goes over a lot of her emails about this issue. She " "includes this phrase: \"Least restrictive cares while allow Mona to live life of choice\" but still have team support       - Mona has a legal guardian named Blanca Ishan - we have a document in Media that confirms this   - Guillermina also mentions a  - Dr. Last who is recommending a neuropsych eval before a decision is made: Thony Last PHD Licensed psychologist - he works assessments and treatment   - Guillermina helps with cleaning and cooking and taking Mona to [employer] - she has no legal authority   - Mona also has a RepPayee that takes care of rent and other bills -    - Consensus is that Mona will not be resistant to help if she needs it      oMna is independent in several ways:   - She lives by herself   - Cooks by self   - she takes the train or bus to work at the [employer]   - she  only uses cash and only has a bank card - no checking account    Mona is vulnerable in other ways   - She has hearing aids, but it is not always clear they are working.  She also may nod her head when you are talking, but not clear that she understand   - she  forgets things   - Mona put her cat in the ice box and it    - She says she doesn't know how to answer the phone or check voice mails          Current Outpatient Medications on File Prior to Visit   Medication Sig Dispense Refill     acetaminophen (TYLENOL) 325 MG tablet Take 650 mg by mouth every 6 (six) hours as needed for pain.       albuterol (PROAIR HFA) 90 mcg/actuation inhaler INHALE 2 PUFFS BY MOUTH EVERY 4 HOURS AS NEEDED 8.5 g 5     cholecalciferol, vitamin D3, 1,000 unit (25 mcg) tablet Take 1 tablet (1,000 Units total) by mouth daily. 100 tablet 3     ferrous sulfate 325 (65 FE) MG tablet take 1 tablet (325 mg) by oral route 3 times per day       omega-3 acid ethyl esters (LOVAZA) 1 gram capsule Take 2 g by mouth 2 (two) times a day.       No current facility-administered medications on file prior to visit.          Health Maintenance Due   Topic Date Due     " COVID-19 Vaccine (1 of 2) Never done     ZOSTER VACCINES (2 of 2) 02/16/2021     Given the number to schedule for Covid19 vaccine at [place]    The history section was last reviewed by Philomena Villalta CMA on Mar 16, 2021.    Social History     Tobacco Use   Smoking Status Never Smoker   Smokeless Tobacco Never Used       Social History     Substance and Sexual Activity   Alcohol Use No         Vitals:    03/16/21 1006   BP: 132/80   Pulse: (!) 56   Temp: 98.1  F (36.7  C)   SpO2: 98%     Body mass index is 39.15 kg/m .     EXAM:    General appearance - alert, well appearing, and in no distress  Mental status - pleasant, in good spirits, hard for her to describe symptoms  Chest - clear to auscultation, no wheezes, rales or rhonchi, symmetric air entry, no chest wall tenderness noted  Heart - normal rate, regular rhythm, normal S1, S2, no murmurs, rubs, clicks or gallops  Breasts - breasts appear normal, no suspicious masses, no skin or nipple changes or axillary nodes, mild bilateral tenderness - from tight bra?   Abdomen - soft, nontender, nondistended, no masses or organomegaly  had her lean forward and cough - no hernia palpated

## 2021-06-16 PROBLEM — J45.20: Status: ACTIVE | Noted: 2020-09-16

## 2021-06-16 PROBLEM — E66.01 MORBID OBESITY (H): Status: ACTIVE | Noted: 2020-09-16

## 2021-06-16 PROBLEM — R32 URINARY INCONTINENCE: Status: ACTIVE | Noted: 2018-11-26

## 2021-06-16 NOTE — TELEPHONE ENCOUNTER
Reason for Call:  Other call back      Detailed comments: Guillermina hung up as she stated she is coming down here as Dr Bermudez team did not follow through on info needed form appt on 3/16.    She was very upset.  I did call  and let her know.  They do have paperwork up there for her.    Phone Number Patient can be reached at: Other phone number:  No call back needed as Guillermina is coming in    Best Time: na    Can we leave a detailed message on this number?: No call back needed    Call taken on 3/30/2021 at 11:21 AM by Pamela Behr

## 2021-06-18 NOTE — PATIENT INSTRUCTIONS - HE
Patient Instructions by Anjali Navarro MD at 9/15/2020 10:40 AM     Author: Anjali Navarro MD Service: -- Author Type: Physician    Filed: 9/15/2020 10:34 AM Encounter Date: 9/15/2020 Status: Signed    : Anjali Navarro MD (Physician)         Patient Education     Exercise for a Healthier Heart  You may wonder how you can improve the health of your heart. If youre thinking about exercise, youre on the right track. You dont need to become an athlete, but you do need a certain amount of brisk exercise to help strengthen your heart. If you have been diagnosed with a heart condition, your doctor may recommend exercise to help stabilize your condition. To help make exercise a habit, choose safe, fun activities.       Be sure to check with your health care provider before starting an exercise program.    Why exercise?  Exercising regularly offers many healthy rewards. It can help you do all of the following:    Improve your blood cholesterol levels to help prevent further heart trouble    Lower your blood pressure to help prevent a stroke or heart attack    Control diabetes, or reduce your risk of getting this disease    Improve your heart and lung function    Reach and maintain a healthy weight    Make your muscles stronger and more limber so you can stay active    Prevent falls and fractures by slowing the loss of bone mass (osteoporosis)    Manage stress better  Exercise tips  Ease into your routine. Set small goals. Then build on them.  Exercise on most days. Aim for a total of 150 or more minutes of moderate to  vigorous intensity activity each week. Consider 40 minutes, 3 to 4 times a week. For best results, activity should last for 40 minutes on average. It is OK to work up to the 40 minute period over time. Examples of moderate-intensity activity is walking one mile in 15 minutes or 30 to 45 minutes of yard work.  Step up your daily activity level. Along with your exercise program, try being more active  throughout the day. Walk instead of drive. Do more household tasks or yard work.  Choose one or more activities you enjoy. Walking is one of the easiest things you can do. You can also try swimming, riding a bike, or taking an exercise class.  Stop exercising and call your doctor if you:    Have chest pain or feel dizzy or lightheaded    Feel burning, tightness, pressure, or heaviness in your chest, neck, shoulders, back, or arms    Have unusual shortness of breath    Have increased joint or muscle pain    Have palpitations or an irregular heartbeat      3131-3515 [employer]. [address] [place] [address] 34318. All rights reserved. This information is not intended as a substitute for professional medical care. Always follow your healthcare professional's instructions.         Patient Education   Signs of Hearing Loss  Hearing loss is a problem shared by many people. In fact, it is one of the most common health conditions, particularly as people age. Most people over age 65 have some hearing loss, and by age 80, almost everyone does. Because hearing loss usually occurs slowly over the years, you may not realize your hearing ability has gotten worse.       Have your hearing checked  Contact your [facility] care provider if you:    Have to strain to hear normal conversation.    Have to watch other peoples faces very carefully to follow what theyre saying.    Need to ask people to repeat what theyve said.    Often misunderstand what people are saying.    Turn the volume of the television or radio up so high that others complain.    Feel that people are mumbling when theyre talking to you.    Find that the effort to hear leaves you feeling tired and irritated.    Notice, when using the phone, that you hear better with 1 ear than the other.    8279-5304 [employer]. [address] [place] [address] 85170. All rights reserved. This information is not intended as a substitute for  professional medical care. Always follow your healthcare professional's instructions.         Patient Education   Urinary Incontinence, Female (Adult)  Urinary incontinence means loss of control of the bladder. This problem affects many women, especially as they get older. If you have incontinence, you may be embarrassed to ask for help. But know that this problem can be treated.  Types of Incontinence  There are different types of incontinence. Two of the main types are described here. You can have more than one type.    Stress incontinence. With this type, urine leaks when pressure (stress) is put on the bladder. This may happen when you cough, sneeze, or laugh. Stress incontinence most often occurs because the pelvic floor muscles that support the bladder and urethra are weak. This can happen after pregnancy and vaginal childbirth or a hysterectomy. It can also be due to excess body weight or hormone changes.    Urge incontinence (also called overactive bladder). With this type, a sudden urge to urinate is felt often. This may happen even though there may not be much urine in the bladder. The need to urinate often during the night is common. Urge incontinence most often occurs because of bladder spasms. This may be due to bladder irritation or infection. Damage to bladder nerves or pelvic muscles, constipation, and certain medicines can also lead to urge incontinence.  Treatment of urinary incontinence depends on the cause. Further evaluation is needed to find the type you have. This will likely include an exam and certain tests. Based on the results, you and your healthcare provider can then plan treatment. Until a diagnosis is made, the home care tips below can help relieve symptoms.  Home care    Do pelvic floor muscle exercises, if they are prescribed. The pelvic floor muscles help support the bladder and urethra. Many women find that their symptoms improve when doing special exercises that strengthen these  muscles. To do the exercises contract the muscles you would use to stop your stream of urine, but do this when youre not urinating. Hold for 10 seconds, then relax. Repeat 10 to 20 times in a row, at least 3 times a day. Your provider may give you other instructions for how to do the exercises and how often.    Keep a bladder diary. This helps track how often and how much you urinate over a set period of time. Bring this diary with you to your next visit with the provider. The information can help your provider learn more about your bladder problem.    Lose weight, if advised to by your provider. Excess weight puts pressure on the bladder. Your provider can help you create a weight-loss plan thats right for you. This may include exercising more and making certain diet changes.    Don't consume foods and drinks that may irritate the bladder. These can include alcohol and caffeinated drinks.    Quit smoking. Smoking and other tobacco use can lead to chronic cough that strains the pelvic floor muscles. Smoking may also damage the bladder and urethra. Talk with your provider about treatments or methods you can use to quit smoking.    If drinking large amounts of fluid causes you to have symptoms, you may be advised to limit your fluid intake. You may also be advised to drink most of your fluids during the day and to limit fluids at night.    If youre worried about urine leakage or accidents, you may wear absorbent pads to catch urine. Change the pads often. This helps reduce discomfort. It may also reduce the risk of skin or bladder infections.  Follow-up care  Follow up with your healthcare provider, or as directed. It may take some to find the right treatment for your problem. Your treatment plan may include special therapies or medicines. Certain procedures or surgery may also be options. Be sure to discuss any questions you have with your provider.  When to seek medical advice  Call the healthcare provider right  away if any of these occur:    Fever of 100.4 F (38 C) or higher, or as directed by your provider    Bladder pain or fullness    Abdominal swelling    Nausea or vomiting    Back pain    Weakness, dizziness or fainting  Date Last Reviewed: 10/1/2017    7092-1603 [employer]. [address] [place] [address]. All rights reserved. This information is not intended as a substitute for professional medical care. Always follow your healthcare professional's instructions.     Patient Education   Preventing Falls in the Home  As you get older, falls are more likely. Thats because your reaction time slows. Your muscles and joints may also get stiffer, making them less flexible. Illness, medications, and vision changes can also affect your balance. A fall could leave you unable to live on your own. To make your home safer, follow these tips:    Floors    Put nonskid pads under area rugs.    Remove throw rugs.    Replace worn floor coverings.    Tack carpets firmly to each step on carpeted stairs. Put nonskid strips on the edges of uncarpeted stairs.    Keep floors and stairs free of clutter and cords.    Arrange furniture so there are clear pathways.    Clean up any spills right away.    Bathrooms    Install grab bars in the tub or shower.    Apply nonskid strips or put a nonskid rubber mat in the tub or shower.    Sit on a bath chair to bathe.    Use bathmats with nonskid backing.    Lighting    Keep a flashlight in each room.    Put a nightlight along the pathway between the bedroom and the bathroom.    2020-0941 [employer]. [address] [id number]. All rights reserved. This information is not intended as a substitute for professional medical care. Always follow your healthcare professional's instructions.         Patient Education   Understanding Advance Care Planning  Advance care planning is the process of deciding ones own future medical care. It helps ensure that  if you cant speak for yourself, your wishes can still be carried out. The plan is a series of legal documents that note a persons wishes. The documents vary by state. Advance care planning may be done when a person has a serious illness that is expected to get worse. It may be done before major surgery. And it can help you and your family be prepared in case of a major illness or injury. Advance care planning helps with making decisions at these times.       A health care proxy is a person who acts as the voice of a patient when the patient cant speak for himself or herself. The name of this role varies by state. It may be called a Durable Medical Power of  or Durable Power of  for Healthcare. It may be called an agent, surrogate, or advocate. Or it may be called a representative or decision maker. It is an official duty that is identified by a legal document. The document also varies by state.    Why Is Advance Care Planning Important?  If a person communicates their healthcare wishes:    They will be given medical care that matches their values and goals.    Their family members will not be forced to make decisions in a crisis with no guidance.  Creating a Plan  Making an advance care plan is often done in 3 steps:    Thinking about ones wishes. To create an advance care plan, you should think about what kind of medical treatment you would want if you lose the ability to communicate. Are there any situations in which you would refuse or stop treatment? Are there therapies you would want or not want? And whom do you want to make decisions for you? There are many places to learn more about how to plan for your care. Ask your doctor or  for resources.    Picking a health care proxy. This means choosing a trusted person to speak for you only when you cant speak for yourself. When you cannot make medical decisions, your proxy makes sure the instructions in your advance care plan are  followed. A proxy does not make decisions based on his or her own opinions. They must put aside those opinions and values if needed, and carry out your wishes.    Filling out the legal documents. There are several kinds of legal documents for advance care planning. Each one tells health care providers your wishes. The documents may vary by state. They must be signed and may need to be witnessed or notarized. You can cancel or change them whenever you wish. Depending on your state, the documents may include a Healthcare Proxy form, Living Will, Durable Medical Power of , Advance Directive, or others.  The Familys Role  The best help a family can give is to support their loved ones wishes. Open and honest communication is vital. Family should express any concerns they have about the patients choices while the patient can still make decisions.    8792-3159 [employer]. [address] [place] [address]. All rights reserved. This information is not intended as a substitute for professional medical care. Always follow your healthcare professional's instructions.         Also, [facility] offers a free, downloadable health care directive that allows you to share your treatment choices and personal preferences if you cannot communicate your wishes. It also allows you to appoint another person (called a health care agent) to make health care decisions if you are unable to do so. You can download an advance directive by going here: [email or web address]     Patient Education   Personalized Prevention Plan  You are due for the preventive services outlined below.  Your care team is available to assist you in scheduling these services.  If you have already completed any of these items, please share that information with your care team to update in your medical record.  Health Maintenance   Topic Date Due   ? HEPATITIS C SCREENING  1950   ? ADVANCE CARE  PLANNING  10/22/1970   ? ZOSTER VACCINES (1 of 2) 10/22/2002   ? PNEUMOCOCCAL IMMUNIZATION 65+ LOW/MEDIUM RISK (1 of 2 - PCV13) 10/22/2017   ? DXA SCAN  10/22/2017   ? MEDICARE ANNUAL WELLNESS VISIT  06/05/2018   ? TD 18+ HE  01/06/2019   ? FALL RISK ASSESSMENT  11/26/2019   ? INFLUENZA VACCINE RULE BASED (1) 08/01/2020   ? MAMMOGRAM  07/21/2022   ? LIPID  11/26/2023   ? COLORECTAL CANCER SCREENING  10/31/2028   ? HEPATITIS B VACCINES  Aged Out

## 2021-06-19 NOTE — LETTER
Letter by Nadine Higgins SW at      Author: Nadine Higgins SW Service: -- Author Type: --    Filed:  Encounter Date: 4/11/2019 Status: (Other)       April 11, 2019      DEON CRISTINA ZIMMER  [address]  [place] [address]        Dear Deon:    Welcome to [facility] ([facility] health program. My name is BALJINDER Gonsales. I am your MSC+ care coordinator.     I will call you soon to introduce myself and discuss the services you are receiving. I will not replace your DD waiver , but we will work together to coordinate your Medicaid and waiver benefits.Our goal is to provide services that will keep you as healthy and independent as possible.     [facility] includes the benefits you may receive from Medical Assistance. Soon, you will receive a new MSC+ member identification (ID) card from [facility]. Please use this card whenever you get health services. If you have Medicare, you will need to show your Medicare card when you get health services.       If you have questions, please call me at [phone]. If you reach my voice mail, leave a message and your phone number. If you are hearing impaired, please call the [place] Relay at [phone] or [phone] (jygoto-js-ckekbh relay service).    Sincerely,           BALJINDER Gonsales    E-mail: [email or web address]   Phone: [phone]      Lori Beltran          MS MSC+ [id number] DHS Approved ([id number])                                 [id number] (11/18)                             _

## 2021-06-19 NOTE — LETTER
Letter by Roderick Monroy MD at      Author: Roderick Monroy MD Service: -- Author Type: --    Filed:  Encounter Date: 2/26/2019 Status: (Other)         March 20, 2019    Monasejal Barnett  [address]  [place] [address]      Dear Mona,    [facility] staff was able to verify that you have not yet established care with a new healthcare provider.     As a reminder, Dr. Monroy has recently retired from the clinic.     Please contact the [facility], and a member of our clinical staff would be happy to assist you with scheduling an appointment to set up care with a new physician.     Please call [phone], and ask to schedule an establish care appointment with a new provider.     Thank you!

## 2021-06-20 NOTE — PROGRESS NOTES
Subjective:   Mona comes in with a one-week history of a bump on the inferior portion of the left breast.  She noticed this a week ago because it was starting to hurt.  It has gotten a little bigger.  She denies any injury to the area.  She is up-to-date on mammograms.  She denies any drainage from the area.        Objective:  Breasts: Left breast is evaluated.  No masses noted other than a cystic structure in the inferior central part of the breast.  This is slightly inflamed and reddened.  There is a central follicle present.  It measures approximately 1 cm in diameter.  No skin breakdown present.  No exudate present.  Right breast has no masses.      Assessment:  1.  Sebaceous cyst left breast with infection      Plan:  Patient will warm pack the area.  Start Augmentin 875 mg twice daily.  She will take this with food.  Follow-up here only if symptoms would persist or worsen.  Colonoscopy will be scheduled as she has not had this done.  She will make her own appointment for mammogram within the next 6 months to update that.

## 2021-06-20 NOTE — LETTER
Letter by Nadine Higgins SW at      Author: Nadine Higgins SW Service: -- Author Type: --    Filed:  Encounter Date: 1/14/2020 Status: (Other)       January 29, 2020    DEON CRISTINA ZIMMER  [address]  [place] [address]    Dear Deon:     Your Care Coordinator has been unable to reach you by telephone. I am writing to ask you or a family member to call me at [phone]. If you reach my voicemail, please leave a message with your daytime telephone number and a date and time that I can call you. If you are hearing impaired, please call the [place] Relay at [phone] or [phone] (uvodxq-ci-dmextd relay service).     The reason I am trying to reach you is:     [] Six (6) month check-in  [] To schedule your annual assessment  [x] Other: Initial Assessment    Please call me as soon as you receive this letter. I look forward to speaking with you.    Sincerely,      BALJINEDR Gonsales    E-mail: [email or web address]   Phone: [phone]      [facility] [id number] [place] [id number]    [id number] (11/18)

## 2021-06-20 NOTE — LETTER
Letter by Anjali Navarro MD at      Author: Anjali Navarro MD Service: -- Author Type: --    Filed:  Encounter Date: 9/17/2020 Status: (Other)         Mona Barnett  [address]  [place] [address]             September 17, 2020         Dear Ms. Ericka,    Below are the results from your recent visit: Your hemoglobin (iron level) and there blood counts are stable. Your electrolytes, kidney and liver function are normal. Your screening test for hepatitis C is negative.     Your A1C test shows that your average blood sugar is above normal, but not at diabetes level.  Continuing to exercise regularly is the best way to prevent this from progressing to diabetes.  We should check this level once a year, or sooner if you develop symptoms like frequent need to urinate or excessive thirst.     Your vitamin D level is a bit low.  I will send in a prescription for Vitamin D3 1000 units for you to take daily.  This is important to help your body absorb calcium and maintain healthy bones.    Please call with questions or contact us using [employer].    Sincerely,    Electronically signed by Anjali Navarro MD    Resulted Orders   HM2(CBC w/o Differential)   Result Value Ref Range    WBC 4.6 4.0 - 11.0 thou/uL    RBC 5.14 3.80 - 5.40 mill/uL    Hemoglobin 12.3 12.0 - 16.0 g/dL    Hematocrit 39.0 35.0 - 47.0 %    MCV 76 (L) 80 - 100 fL    MCH 24.0 (L) 27.0 - 34.0 pg    MCHC 31.6 (L) 32.0 - 36.0 g/dL    RDW 15.2 (H) 11.0 - 14.5 %    Platelets 385 140 - 440 thou/uL    MPV 7.0 7.0 - 10.0 fL   Glycosylated Hemoglobin A1c   Result Value Ref Range    Hemoglobin A1c 6.0 (H) <=5.6 %      Comment:      Normal <5.7% Prediabete 5.7-6.4% Diabletes 6.5% or higher - adopted from ADA consensus guidelines   Comprehensive Metabolic Panel   Result Value Ref Range    Sodium 140 136 - 145 mmol/L    Potassium 4.5 3.5 - 5.0 mmol/L    Chloride 103 98 - 107 mmol/L    CO2 26 22 - 31 mmol/L    Anion Gap, Calculation 11 5 - 18 mmol/L    Glucose 83  70 - 125 mg/dL    BUN 11 8 - 22 mg/dL    Creatinine 0.68 0.60 - 1.10 mg/dL    GFR MDRD Af Amer >60 >60 mL/min/1.73m2    GFR MDRD Non Af Amer >60 >60 mL/min/1.73m2    Bilirubin, Total 0.5 0.0 - 1.0 mg/dL    Calcium 9.0 8.5 - 10.5 mg/dL    Protein, Total 8.0 6.0 - 8.0 g/dL    Albumin 3.6 3.5 - 5.0 g/dL    Alkaline Phosphatase 118 45 - 120 U/L    AST 13 0 - 40 U/L    ALT 12 0 - 45 U/L    Narrative    Fasting Glucose reference range is 70-99 mg/dL per  American Diabetes Association (ADA) guidelines.   Vitamin D, Total (25-Hydroxy)   Result Value Ref Range    Vitamin D, Total (25-Hydroxy) 25.0 (L) 30.0 - 80.0 ng/mL    Narrative    Deficiency <10.0 ng/mL  Insufficiency 10.0-29.9 ng/mL  Sufficiency 30.0-80.0 ng/mL  Toxicity (possible) >100.0 ng/mL   Hepatitis C Antibody (Anti-HCV)   Result Value Ref Range    Hepatitis C Ab Negative Negative

## 2021-06-20 NOTE — LETTER
Letter by Anjali Navarro MD at      Author: Anjali Navarro MD Service: -- Author Type: --    Filed:  Encounter Date: 9/15/2020 Status: (Other)       My Asthma Action Plan     Name: Mona Barnett   YOB: 1950  Date: 9/16/2020   My doctor: Anjali Navarro MD   My clinic: [facility]         My Rescue Medicine:   Albuterol (Proair/Ventolin/Proventil HFA) 2-4 puffs EVERY 4 HOURS as needed. Use a spacer if recommended by your provider.   My Asthma Severity:   Intermittent/Exercise Induced  Know your asthma triggers: exercise or sports             GREEN ZONE   Good Control    I feel good    No cough or wheeze    Can work, sleep and play without asthma symptoms     Take your asthma control medicine every day.     1. If exercise triggers your asthma, take your rescue medication    15 minutes before exercise or sports, and    During exercise if you have asthma symptoms  2. Spacer to use with inhaler: If you have a spacer, make sure to use it with your inhaler             YELLOW ZONE Getting Worse  I have ANY of these:    I do not feel good    Cough or wheeze    Chest feels tight    Wake up at night 1. Keep taking your Green Zone medications  2. Start taking your rescue medicine:    every 20 minutes for up to 1 hour. Then every 4 hours for 24-48 hours.  3. If you stay in the Yellow Zone for more than 12-24 hours, contact your doctor.  4. If you do not return to the Green Zone in 12-24 hours or you get worse, start taking your oral steroid medicine if prescribed by your provider.           RED ZONE Medical Alert - Get Help  I have ANY of these:    I feel awful    Medicine is not helping    Breathing getting harder    Trouble walking or talking    Nose opens wide to breathe     1. Take your rescue medicine NOW  2. If your provider has prescribed an oral steroid medicine, start taking it NOW  3. Call your doctor NOW  4. If you are still in the Red Zone after 20 minutes and you have not reached your  doctor:    Take your rescue medicine again and    Call 911 or go to the emergency room right away    See your regular doctor within 2 weeks of an Emergency Room or Urgent Care visit for follow-up treatment.          Annual Reminders:  Meet with Asthma Educator,  Flu Shot in the Fall, consider Pneumonia Vaccination for patients with asthma (aged 19 and older).    Pharmacy:   [facility] [address] - [place], [place] - [address] [facility]  [address] [place] [address]  Phone: [phone] Fax: [phone]    [id number] - [place], [place] - [address] [facility]  [address] [place] [address]  Phone: [phone] Fax: [phone]      Electronically signed by Anjali Navarro MD   Date: 09/16/20                      Asthma Triggers  How To Control Things That Make Your Asthma Worse    Triggers are things that make your asthma worse.  Look at the list below to help you find your triggers and what you can do about them.  You can help prevent asthma flare-ups by staying away from your triggers.      Trigger                                                          What you can do   Cigarette Smoke  Tobacco smoke can make asthma worse. Do not allow smoking in your home, car or around you.  Be sure no one smokes at a marisa day care or school.  If you smoke, ask your health care provider for ways to help you quit.  Ask family members to quit too.  Ask your health care provider for a referral to Quit Plan to help you quit smoking, or call 6-547-573-PLAN.     Colds, Flu, Bronchitis  These are common triggers of asthma. Wash your hands often.  Dont touch your eyes, nose or mouth.  Get a flu shot every year.     Dust Mites  These are tiny bugs that live in cloth or carpet. They are too small to see. Wash sheets and blankets in hot water every week.   Encase pillows and mattress in dust mite proof covers.  Avoid having carpet if you can. If you have  carpet, vacuum weekly.   Use a dust mask and HEPA vacuum.   Pollen and Outdoor Mold  Some people are allergic to trees, grass, or weed pollen, or molds. Try to keep your windows closed.  Limit time out doors when pollen count is high.   Ask you health care provider about taking medicine during allergy season.     Animal Dander  Some people are allergic to skin flakes, urine or saliva from pets with fur or feathers. Keep pets with fur or feathers out of your home.    If you cant keep the pet outdoors, then keep the pet out of your bedroom.  Keep the bedroom door closed.  Keep pets off cloth furniture and away from stuffed toys.     Mice, Rats, and Cockroaches  Some people are allergic to the waste from these pests.   Cover food and garbage.  Clean up spills and food crumbs.  Store grease in the refrigerator.   Keep food out of the bedroom.   Indoor Mold  This can be a trigger if your home has high moisture. Fix leaking faucets, pipes, or other sources of water.   Clean moldy surfaces.  Dehumidify basement if it is damp and smelly.   Smoke, Strong Odors, and Sprays  These can reduce air quality. Stay away from strong odors and sprays, such as perfume, powder, hair spray, paints, smoke incense, paint, cleaning products, candles and new carpet.   Exercise or Sports  Some people with asthma have this trigger. Be active!  Ask your doctor about taking medicine before sports or exercise to prevent symptoms.    Warm up for 5-10 minutes before and after sports or exercise.     Other Triggers of Asthma  Cold air:  Cover your nose and mouth with a scarf.  Sometimes laughing or crying can be a trigger.  Some medicines and food can trigger asthma.

## 2021-06-20 NOTE — LETTER
Letter by Nadine Higgins SW at      Author: Nadine Higgins SW Service: -- Author Type: --    Filed:  Encounter Date: 2/24/2020 Status: (Other)       February 24, 2020    DEON CRISTINA ZIMMER  [address]  [place] [address]        Dear Deon:    At [facility], we are dedicated to improving your health and well-being. Enclosed is the Comprehensive Care Plan that we developed with you on 02/10/2020. Please review the Care Plan carefully.    As a reminder, some of the things we discussed at your visit include:    Your physical and mental health    Ways to reduce falls    Health care needs you may have    Dont forget to contact your care coordinator if you:    Have been hospitalized or plan to be hospitalized     Have had a fall     Have experienced a change in physical health    Are experiencing emotional problems     If you do not agree with your Care Plan, have questions about it, or have experienced a change in your needs, please call me at [phone]. If you are hearing impaired, please call the [place] Relay at 964 or [phone] (ifnwoc-xv-uncjsm relay service).    Sincerely,          BALJINDER Gonsales    E-mail: [email or web address]   Phone: [phone]      [id number] [place] ([id number]     [id number] (11/18)

## 2021-06-21 NOTE — LETTER
Letter by Anjali Navarro MD at      Author: Anjali Navarro MD Service: -- Author Type: --    Filed:  Encounter Date: 10/26/2020 Status: (Other)         Mona Barnett  [address]  [place] [address]             October 26, 2020         Dear Ms. Barnett,    Below are the results from your recent visit: You have osteopenia - low bone density, but not low enough to start medication.  Weight bearing exercise, and adequate calcium (aim for about 1000 mg daily from diet and or supplements)  and vitamin D intake (probalby about 1000 iu daily)  may help this from progressing.     Calcium will mostly come from dairy products (milk, yogurt, cheese) but there are some vegetables that have calcium too like bok dean, Chinese mustard greens and chinese cabbage flower leaves.  Vitamin D is added to dairy products, can be found in some fish like salmon and sardines, and your body makes some in response to sunlight - which is somewhat lacking in [place]!     We should recheck your DEXA in two years.    I've looked over web sites explaining osteopenia - this is the nicest one I could find    [email or web address].com/health-wellness/body-mind-spirit/bone-joint/[email or web address]      Please call with questions or contact us using [employer].    Sincerely,    Electronically signed by Anjali Navarro MD      Resulted Orders   DXA Bone Density Scan    Narrative    10/20/2020      RE: Mona Siddiqinn  YOB: 1950        Dear Anjali Navarro,    Patient Profile:  69 y.o. female, postmenopausal, is here for the first bone density test.   History of fractures - None. Family history of osteoporosis - None.    Family history of hip fracture: None. Smoking history - No. Osteoporosis   treatment past -  No. Osteoporosis treatment current - No.  Chronic   medical problems - None. High risk medications -  None.      Assessment:    1. The spine bone density L1-L4 with T-score -0.3.  2. Femoral bone densities show left femoral neck T- score  -1.5 and right   femoral neck T-score -1.6.  3. Trabecular bone score indicates moderate trabecular bone architecture.      69 y.o. female with LOW BONE DENSITY (OSTEOPENIA) and LOW fracture risk,   adjusted for the TBS, with major osteoporotic fracture risk 4.4% and hip   fracture risk 0.5%.       Recommendations:  Appropriate calcium, vitamin D supplements, along with balance and weight   bearing exercise recommended with follow up bone density scan in 2 years.      Bone densitometry was performed on your patient using our L[employer]Tail   densitometer. The results are summarized and a copy of the actual scans   are included for your review. In conformity with the International Society   of Clinical Densitometry's most recent position statement for DXA   interpretation (2015), the diagnosis will be made on the lowest measured   T-score of the lumbar spine, femoral neck, total proximal femur or 33%   radius. Note the change in terminology for diagnostic classification from   OSTEOPENIA to LOW BONE MASS. All trending for sequential exams will be   done using multiple vertebrae or the total proximal femur. Fracture risk   is based on the WHO Fracture Risk Assessment Tool (FRAX). If additional   information is needed or if you would like to discuss the results, please   do not hesitate to call me.       Thank you for referring this patient to [facility].   We are happy to be of service in support of you and your practice. If you   have any questions or suggestions to improve our service, please call me   at [phone].     Sincerely,     Nohemi Vo M.D. CMaxwellCANTWAN.  Osteoporosis Services, [facility]

## 2021-06-21 NOTE — LETTER
Letter by Anjali Navarro MD at      Author: Anjali Navarro MD Service: -- Author Type: --    Filed:  Encounter Date: 3/16/2021 Status: (Other)         Re: Mona Barnett         [address]        [place] [address]    Addendum to the Physician's Statement in Support of Guardianship           2021            To whom it may concern:      I am the Primary Care Provider for the above named patient.  I have seen Ms. Barnett twice first on the  day she establish care with me at a Medicare Annual Wellness visit, and today.  She has been seen  in the [facility] system since , and I have access to her records and  notes of her previous PCP Dr. Monroy.  She is generally healthy, he main other issues being hearing  loss, mild intermittent asthma, and intellectual disabilities - the latter is the diagnosis on her chart   My understanding is that consideration is being given as to whether Mona can be her own guardian,  but continue to have support as needed form ILS worker and Rep Payee and other personnel   In ways, Mona IS independent: she lives on her own, takes public transportation to work, uses cash as  needed.  She has Rep Payee for bills and an ILS worker who helps her with cleaning and taking to  appointments.    On the other hand, I am worried about her vulnerability  o She is a poor historian, at least for medical issues  o She does not give descriptions with any depth of detail  o I have observed -  and her ILS worker also notes - that she may nod or give short answers to questions she may not understand  o Her ILS worker says she may not answer emails or phone calls.    o She has exhibited some poor judgment (her ILS tells me she put her cat in the icebox, where the creature )                      I would be very concerned that in either an Emergency situation or a situation where someone might  try to take disadvantage of her, if her judgement is poor and  her ability to express herself poor, there  would be no one who could legally help her/step in for her.  I am not familiar with the nuances of the law/consideration/recommendation around the issues of guardianship/conservatorship which is why I have specifically chosen NOT to make a firm recommendation.  However I do support further neuropsychological evaluation, and will put in a referral for her to have this         Sincerely,         Anjali Navarro MD

## 2021-06-21 NOTE — LETTER
Letter by Nadine Higgins SW at      Author: Nadine Higgins SW Service: -- Author Type: --    Filed:  Encounter Date: 1/25/2021 Status: (Other)       January 25, 2021    DEON CRISTINA ZIMMER  [address]  [place] [address]        Dear Deon:    At [facility], we are dedicated to improving your health and well-being. Enclosed is the Comprehensive Care Plan that we developed with you on 01/18/2021. Please review the Care Plan carefully.    As a reminder, some of the things we discussed at your visit include:    Your physical and mental health    Ways to reduce falls    Health care needs you may have    Dont forget to contact your care coordinator if you:    Have been hospitalized or plan to be hospitalized     Have had a fall     Have experienced a change in physical health    Are experiencing emotional problems     If you do not agree with your Care Plan, have questions about it, or have experienced a change in your needs, please call me at [phone]. If you are hearing impaired, please call the [place] Relay at 429 or [phone] (pacofx-ac-gpaicw relay service).    Sincerely,          BALJINDER Gonsales    E-mail: [email or web address]   Phone: [phone]      Lori Beltran                [facility]+[id number] [place] ([id number])     [id number] (11/18)

## 2021-06-21 NOTE — PROGRESS NOTES
Assessment and Plan:   1.  Annual wellness visit  2.  History of anemia.  Rule out thalassemia.  Rule out anemia of chronic disease.  3.  Urinary incontinence  4.  Obesity  5.  History of Deysi lithiasis      The patient's current medical problems were reviewed.    The following high BMI interventions were performed this visit: encouragement to exercise, weight monitoring and weight loss from baseline weight  The following health maintenance schedule was reviewed with the patient and provided in printed form in the after visit summary:   Health Maintenance   Topic Date Due     ASTHMA FOLLOW-UP  1950     ADVANCE DIRECTIVES DISCUSSED WITH PATIENT  10/22/1970     ZOSTER VACCINES (1 of 2) 10/22/2002     DXA SCAN  10/22/2017     PNEUMOCOCCAL POLYSACCHARIDE VACCINE AGE 65 AND OVER  10/22/2017     PNEUMOCOCCAL CONJUGATE VACCINE FOR ADULTS (PCV13 OR PREVNAR)  10/22/2017     INFLUENZA VACCINE RULE BASED (1) 08/01/2018     TD 18+ HE  01/06/2019     MAMMOGRAM  06/05/2019     ASTHMA CONTROL TEST  11/26/2019     FALL RISK ASSESSMENT  11/26/2019     COLONOSCOPY  10/31/2028        Subjective:   Chief Complaint: Mona Barnett is an 68 y.o. female here for an Annual Wellness visit.   HPI: Mona comes in today for an annual wellness visit.  She is in a group home.  She has some intellectual disabilities which affect her ability to live independently.  She also has a history of stress incontinence.  She has a history of gallstones.  She has some hearing loss.  She has a history of a stable anemia.  Her iron levels actually were normal last time they were checked.  She denies any blood loss of any type.  She does not participate in any particular exercise program but stays busy with her work.  She does work full-time.    Review of Systems:    Please see above.  The rest of the review of systems are negative for all systems.    Patient Care Team:  Roderick Monroy MD as PCP - General     Patient Active Problem List   Diagnosis      Cholelithiasis     Feelings Of Urinary Urgency     Iron Deficiency     Limb Pain     Intellectual Disabilities     Hearing Loss     Hidradenitis Suppurativa     Urinary incontinence     Past Medical History:   Diagnosis Date     Cholelithiasis      Hearing loss      Hidradenitis suppurativa      Iron deficiency      Limb pain      Mild intellectual disabilities (CODE)       Past Surgical History:   Procedure Laterality Date     BREAST CYST EXCISION Right     Had cyst removed many years ago     OR REMOVAL GALLBLADDER      Description: Cholecystectomy;  Recorded: 04/15/2009;     OR TOTAL ABDOM HYSTERECTOMY      Description: Hysterectomy;  Recorded: 04/15/2009;      Family History   Problem Relation Age of Onset     Breast cancer Neg Hx       Social History     Socioeconomic History     Marital status: Single     Spouse name: Not on file     Number of children: Not on file     Years of education: Not on file     Highest education level: Not on file   Social Needs     Financial resource strain: Not on file     Food insecurity - worry: Not on file     Food insecurity - inability: Not on file     Transportation needs - medical: Not on file     Transportation needs - non-medical: Not on file   Occupational History     Not on file   Tobacco Use     Smoking status: Never Smoker     Smokeless tobacco: Never Used   Substance and Sexual Activity     Alcohol use: No     Drug use: No     Sexual activity: No   Other Topics Concern     Not on file   Social History Narrative     Not on file      Current Outpatient Medications   Medication Sig Dispense Refill     albuterol (PROAIR HFA) 90 mcg/actuation inhaler INHALE 2 PUFFS BY MOUTH EVERY 4 HOURS AS NEEDED 8.5 g 5     cholecalciferol, vitamin D3, (VITAMIN D3) 4,000 unit cap Take 1 tab daily 90 capsule 3     ferrous sulfate 325 (65 FE) MG tablet take 1 tablet (325 mg) by oral route 3 times per day       No current facility-administered medications for this visit.       Objective:  "  Vital Signs:   Visit Vitals  /64   Pulse 73   Temp 97.8  F (36.6  C) (Oral)   Resp 16   Ht 4' 11.25\" (1.505 m)   Wt 196 lb (88.9 kg)   SpO2 99%   BMI 39.25 kg/m         VisionScreening:  No exam data present     PHYSICAL EXAM  HEENT: Pupils equally round and reactive to light.  Minimal cataract tissue noted.  Both TMs are gray.  Pharynx is clear.  Neck: No thyromegaly present.  No bruits heard.  No lymphadenopathy present.  Lungs: Lungs today are clear bilaterally.  No rales or wheezes heard.  Patient is in no respiratory distress.  Cardiac: There is a regular rhythm present.  I heard no murmur today.  No ectopy present.  Abdomen: Abdomen is obese but soft.  No masses noted.  No hepatomegaly present.  No rebound, guarding or rigidity present.  Bowel sounds are active throughout all quadrants.  No suprapubic tenderness present.  Pelvic: Not performed  Musculoskeletal: There is 1+ edema noted in both feet and pretibial areas.  Pulses are strong.  Sensory exam of toes normal.  No skin breakdown present in either foot.  Neurologic: Cranial nerves all are intact.  Patient is alert and oriented.  Motor strength appears normal.  Gait was stable.      Assessment Results 11/26/2018   Activities of Daily Living No help needed   Instrumental Activities of Daily Living No help needed   Mini Cog Total Score 0   Some recent data might be hidden     A Mini-Cog score of 0-2 suggests the possibility of dementia, score of 3-5 suggests no dementia    Identified Health Risks:     She is at risk for lack of exercise and has been provided with information to increase physical activity for the benefit of her well-being.  The patient was provided with written information regarding signs of hearing loss.  Information on urinary incontinence and treatment options given to patient.  Information regarding advance directives (living flores), including where she can download the appropriate form, was provided to the patient via the AVS. "       The patient was provided with appropriate referrals to address her memory problem.

## 2021-06-24 NOTE — TELEPHONE ENCOUNTER
CMT left message x 1. Please review message thread below and advise the patient as indicated. Please schedule if necessary or indicated in message thread.  CMT will attempt to reach the patient x 3 per clinical protocol before sending a letter.

## 2021-06-24 NOTE — TELEPHONE ENCOUNTER
Clinic staff is  calling to remind her that Dr. Monroy has recently retired from practice at the [facility].    Patient replies that she would like her counselor to help her decide which provider to choose.  She sees her counselor on Monday and Tuesday.    She would like a call back in a week.    Thank you.

## 2021-07-03 NOTE — ADDENDUM NOTE
Addendum Note by Sofia Monroy MD at 11/26/2018  1:30 PM     Author: Sofia Monroy MD Service: -- Author Type: Physician    Filed: 11/27/2018  7:52 AM Encounter Date: 11/26/2018 Status: Signed    : Sofia Monroy MD (Physician)    Addended by: SOFIA MONROY on: 11/27/2018 07:52 AM        Modules accepted: Level of Service

## 2021-07-26 ENCOUNTER — ANCILLARY PROCEDURE (OUTPATIENT)
Dept: MAMMOGRAPHY | Facility: CLINIC | Age: 71
End: 2021-07-26
Attending: FAMILY MEDICINE
Payer: MEDICARE

## 2021-07-26 DIAGNOSIS — Z12.31 VISIT FOR SCREENING MAMMOGRAM: ICD-10-CM

## 2021-07-26 PROCEDURE — 77067 SCR MAMMO BI INCL CAD: CPT | Mod: TC | Performed by: RADIOLOGY

## 2021-08-10 ENCOUNTER — OFFICE VISIT (OUTPATIENT)
Dept: FAMILY MEDICINE | Facility: CLINIC | Age: 71
End: 2021-08-10
Payer: MEDICARE

## 2021-08-10 VITALS
HEART RATE: 63 BPM | OXYGEN SATURATION: 96 % | BODY MASS INDEX: 39.08 KG/M2 | SYSTOLIC BLOOD PRESSURE: 120 MMHG | WEIGHT: 187 LBS | DIASTOLIC BLOOD PRESSURE: 70 MMHG

## 2021-08-10 DIAGNOSIS — L02.92 BOIL: Primary | ICD-10-CM

## 2021-08-10 PROCEDURE — 99213 OFFICE O/P EST LOW 20 MIN: CPT | Performed by: FAMILY MEDICINE

## 2021-08-10 ASSESSMENT — ASTHMA QUESTIONNAIRES
QUESTION_2 LAST FOUR WEEKS HOW OFTEN HAVE YOU HAD SHORTNESS OF BREATH: NOT AT ALL
QUESTION_1 LAST FOUR WEEKS HOW MUCH OF THE TIME DID YOUR ASTHMA KEEP YOU FROM GETTING AS MUCH DONE AT WORK, SCHOOL OR AT HOME: NONE OF THE TIME
QUESTION_5 LAST FOUR WEEKS HOW WOULD YOU RATE YOUR ASTHMA CONTROL: WELL CONTROLLED
QUESTION_3 LAST FOUR WEEKS HOW OFTEN DID YOUR ASTHMA SYMPTOMS (WHEEZING, COUGHING, SHORTNESS OF BREATH, CHEST TIGHTNESS OR PAIN) WAKE YOU UP AT NIGHT OR EARLIER THAN USUAL IN THE MORNING: NOT AT ALL
QUESTION_4 LAST FOUR WEEKS HOW OFTEN HAVE YOU USED YOUR RESCUE INHALER OR NEBULIZER MEDICATION (SUCH AS ALBUTEROL): NOT AT ALL
ACT_TOTALSCORE: 24

## 2021-08-10 NOTE — PROGRESS NOTES
Assessment and Plan    Boil  Over left mons pubis -  well healed post incision at ED- continue warm pacs until minor drainage is completely gone. However if fluid re-accumulates, this may merit surgery to remove the cyst wall    Return in 6 weeks (on 9/21/2021) for Routine preventive, or earlier as needed.    Anjali Navarro MD     -------------------------------------------    From ED visit 7/27/21:  Chief concern:       UR Course   Incision and Drainage:  Indication: Cyst    Location: right suprapubic area    Anesthesia: Local field block using Lidocaine 1% plain , total of 3 ccs    Procedure: The area was incised with a # 11 Blade ([place]) using an Elliptical incision. Wound treatment included a small amount Purulent and bloody Drainage. irrigate, no packing. An appropriate dressing was applied to cover the area.    Patient Status: The patient tolerated the procedure well and there were no complications.    UR Course Summary:   Reviewed:  I reviewed the patient's past medical history, previous medical records and nursing notes    Assessments:  10:02 AM I performed initial history and physical exam of the patient.    Disposition:  Discharged    Medical Decision Making   Impression and Plan:  oMna Barnett is a 70 y.o. female who presented for evaluation of cyst on her suprapubic region. Incision and drainage was performed as noted above. There is no evidence of necrotizing fascitis, lymphangitis, lymphadenitis, osteomyelitis, sepsis, or shock. Supportive outpatient management is indicated and I have recommended treatment with warm compresses and oral antibiotics as noted below. I discussed that today's procedure is not definitive treatment for this cyst and that she will need surgical follow up to have this removed. I discussed signs and symptoms for which she should return including high fever, spreading redness, or any other emergent concern. She will otherwise follow up with her PCP as needed.      ---    Mona still has episodic burst of pain from where she had the incision and drainage, but overall she has improved. She has not chills or fever    She comes with her ILS worker. At last visit she asked me to write a letter about Mona's need for continued guardianship as the state is trying to cull the number of guardians in their system and reexamine necessity.  We agreed that though Mona live independently and works, she DOES need someone to check in on her and she is unable to participate in complex decision making.  ILS worker says that Mona WAS granted ongoing guardianship.        Current Outpatient Medications   Medication     albuterol (PROAIR HFA) 90 mcg/actuation inhaler     cholecalciferol, vitamin D3, 1,000 unit (25 mcg) tablet     ferrous sulfate 325 (65 FE) MG tablet     polyethylene glycol-propylene glycol (SYSTANE ULTRA) 0.4-0.3 % SOLN ophthalmic solution     acetaminophen (TYLENOL) 325 MG tablet     omega-3 acid ethyl esters (LOVAZA) 1 gram capsule     No current facility-administered medications for this visit.       The history section was last reviewed by Billie Vogel on Aug 10, 2021.    History   Smoking Status     Never Smoker   Smokeless Tobacco     Never Used       Social History    Substance and Sexual Activity      Alcohol use: No        /70   Pulse 63   Wt 84.8 kg (187 lb)   SpO2 96%   BMI 39.08 kg/m    Body mass index is 39.08 kg/m .     EXAM:    General appearance - alert, well appearing, and in no distress  Mental status - pleasant, happy, slight speech impediment due to hearing loss  Skin - Over left mons pubis -  well healed incision with a small amount of drainage. no flutuance beneath, and slight induration of skin around incision without redness or warmth

## 2021-08-11 ASSESSMENT — ASTHMA QUESTIONNAIRES: ACT_TOTALSCORE: 24

## 2021-09-21 ENCOUNTER — OFFICE VISIT (OUTPATIENT)
Dept: FAMILY MEDICINE | Facility: CLINIC | Age: 71
End: 2021-09-21
Payer: MEDICARE

## 2021-09-21 VITALS
HEIGHT: 59 IN | SYSTOLIC BLOOD PRESSURE: 138 MMHG | OXYGEN SATURATION: 97 % | BODY MASS INDEX: 37.21 KG/M2 | HEART RATE: 60 BPM | WEIGHT: 184.6 LBS | DIASTOLIC BLOOD PRESSURE: 72 MMHG

## 2021-09-21 DIAGNOSIS — J45.20: ICD-10-CM

## 2021-09-21 DIAGNOSIS — Z00.00 ENCOUNTER FOR MEDICARE ANNUAL WELLNESS EXAM: Primary | ICD-10-CM

## 2021-09-21 DIAGNOSIS — E55.9 VITAMIN D DEFICIENCY: ICD-10-CM

## 2021-09-21 DIAGNOSIS — Z12.31 ENCOUNTER FOR SCREENING MAMMOGRAM FOR BREAST CANCER: ICD-10-CM

## 2021-09-21 DIAGNOSIS — R73.03 PRE-DIABETES: ICD-10-CM

## 2021-09-21 DIAGNOSIS — D50.8 OTHER IRON DEFICIENCY ANEMIA: ICD-10-CM

## 2021-09-21 LAB
ERYTHROCYTE [DISTWIDTH] IN BLOOD BY AUTOMATED COUNT: 16.7 % (ref 10–15)
HBA1C MFR BLD: 5.5 % (ref 0–5.6)
HCT VFR BLD AUTO: 37.4 % (ref 35–47)
HGB BLD-MCNC: 11.2 G/DL (ref 11.7–15.7)
MCH RBC QN AUTO: 24.1 PG (ref 26.5–33)
MCHC RBC AUTO-ENTMCNC: 29.9 G/DL (ref 31.5–36.5)
MCV RBC AUTO: 80 FL (ref 78–100)
PLATELET # BLD AUTO: 355 10E3/UL (ref 150–450)
RBC # BLD AUTO: 4.65 10E6/UL (ref 3.8–5.2)
WBC # BLD AUTO: 4.3 10E3/UL (ref 4–11)

## 2021-09-21 PROCEDURE — 83036 HEMOGLOBIN GLYCOSYLATED A1C: CPT | Performed by: FAMILY MEDICINE

## 2021-09-21 PROCEDURE — 82306 VITAMIN D 25 HYDROXY: CPT | Performed by: FAMILY MEDICINE

## 2021-09-21 PROCEDURE — 90662 IIV NO PRSV INCREASED AG IM: CPT | Performed by: FAMILY MEDICINE

## 2021-09-21 PROCEDURE — G0008 ADMIN INFLUENZA VIRUS VAC: HCPCS | Performed by: FAMILY MEDICINE

## 2021-09-21 PROCEDURE — 85027 COMPLETE CBC AUTOMATED: CPT | Performed by: FAMILY MEDICINE

## 2021-09-21 PROCEDURE — 36415 COLL VENOUS BLD VENIPUNCTURE: CPT | Performed by: FAMILY MEDICINE

## 2021-09-21 PROCEDURE — 99397 PER PM REEVAL EST PAT 65+ YR: CPT | Mod: 25 | Performed by: FAMILY MEDICINE

## 2021-09-21 RX ORDER — ALBUTEROL SULFATE 90 UG/1
AEROSOL, METERED RESPIRATORY (INHALATION)
Qty: 8.5 G | Refills: 5 | Status: SHIPPED | OUTPATIENT
Start: 2021-09-21 | End: 2022-12-06

## 2021-09-21 RX ORDER — FERROUS SULFATE 325(65) MG
TABLET ORAL
Qty: 100 TABLET | Refills: 3 | Status: SHIPPED | OUTPATIENT
Start: 2021-09-21

## 2021-09-21 ASSESSMENT — ACTIVITIES OF DAILY LIVING (ADL)
CURRENT_FUNCTION: TELEPHONE REQUIRES ASSISTANCE
CURRENT_FUNCTION: HOUSEWORK REQUIRES ASSISTANCE
CURRENT_FUNCTION: BATHING REQUIRES ASSISTANCE

## 2021-09-21 ASSESSMENT — MIFFLIN-ST. JEOR: SCORE: 1272.97

## 2021-09-21 NOTE — LETTER
My Asthma Action Plan    Name: Mona Barnett   YOB: 1950  Date: 9/22/2021   My doctor: Anjali Navarro MD   My clinic: [facility]        My Rescue Medicine:   Albuterol inhaler (Proair/Ventolin/Proventil HFA)  2-4 puffs EVERY 4 HOURS as needed. Use a spacer if recommended by your provider.   My Asthma Severity:   Intermittent / Exercise Induced  Know your asthma triggers: exercise or sports             GREEN ZONE   Good Control    I feel good    No cough or wheeze    Can work, sleep and play without asthma symptoms       Take your asthma control medicine every day.     1. If exercise triggers your asthma, take your rescue medication    15 minutes before exercise or sports, and    During exercise if you have asthma symptoms  2. Spacer to use with inhaler: If you have a spacer, make sure to use it with your inhaler             YELLOW ZONE Getting Worse  I have ANY of these:    I do not feel good    Cough or wheeze    Chest feels tight    Wake up at night   1. Keep taking your Green Zone medications  2. Start taking your rescue medicine:    every 20 minutes for up to 1 hour. Then every 4 hours for 24-48 hours.  3. If you stay in the Yellow Zone for more than 12-24 hours, contact your doctor.  4. If you do not return to the Green Zone in 12-24 hours or you get worse, start taking your oral steroid medicine if prescribed by your provider.           RED ZONE Medical Alert - Get Help  I have ANY of these:    I feel awful    Medicine is not helping    Breathing getting harder    Trouble walking or talking    Nose opens wide to breathe       1. Take your rescue medicine NOW  2. If your provider has prescribed an oral steroid medicine, start taking it NOW  3. Call your doctor NOW  4. If you are still in the Red Zone after 20 minutes and you have not reached your doctor:    Take your rescue medicine again and    Call 911 or go to the emergency room right away    See your  regular doctor within 2 weeks of an Emergency Room or Urgent Care visit for follow-up treatment.          Annual Reminders:  Meet with Asthma Educator,  Flu Shot in the Fall, consider Pneumonia Vaccination for patients with asthma (aged 19 and older).    Pharmacy:    [id number] - [place], [place] - [address] [place] AT [facility] [id number] IN [employer] - [place], [place] - [address]    Electronically signed by Anjali Navarro MD   Date: 09/22/21                    Asthma Triggers  How To Control Things That Make Your Asthma Worse    Triggers are things that make your asthma worse.  Look at the list below to help you find your triggers and   what you can do about them. You can help prevent asthma flare-ups by staying away from your triggers.      Trigger                                                          What you can do   Cigarette Smoke  Tobacco smoke can make asthma worse. Do not allow smoking in your home, car or around you.  Be sure no one smokes at a child s day care or school.  If you smoke, ask your health care provider for ways to help you quit.  Ask family members to quit too.  Ask your health care provider for a referral to Quit Plan to help you quit smoking, or call 7-757-802-PLAN.     Colds, Flu, Bronchitis  These are common triggers of asthma. Wash your hands often.  Don t touch your eyes, nose or mouth.  Get a flu shot every year.     Dust Mites  These are tiny bugs that live in cloth or carpet. They are too small to see. Wash sheets and blankets in hot water every week.   Encase pillows and mattress in dust mite proof covers.  Avoid having carpet if you can. If you have carpet, vacuum weekly.   Use a dust mask and HEPA vacuum.   Pollen and Outdoor Mold  Some people are allergic to trees, grass, or weed pollen, or molds. Try to keep your windows closed.  Limit time out doors when pollen count is high.   Ask you health care provider about  taking medicine during allergy season.     Animal Dander  Some people are allergic to skin flakes, urine or saliva from pets with fur or feathers. Keep pets with fur or feathers out of your home.    If you can t keep the pet outdoors, then keep the pet out of your bedroom.  Keep the bedroom door closed.  Keep pets off cloth furniture and away from stuffed toys.     Mice, Rats, and Cockroaches  Some people are allergic to the waste from these pests.   Cover food and garbage.  Clean up spills and food crumbs.  Store grease in the refrigerator.   Keep food out of the bedroom.   Indoor Mold  This can be a trigger if your home has high moisture. Fix leaking faucets, pipes, or other sources of water.   Clean moldy surfaces.  Dehumidify basement if it is damp and smelly.   Smoke, Strong Odors, and Sprays  These can reduce air quality. Stay away from strong odors and sprays, such as perfume, powder, hair spray, paints, smoke incense, paint, cleaning products, candles and new carpet.   Exercise or Sports  Some people with asthma have this trigger. Be active!  Ask your doctor about taking medicine before sports or exercise to prevent symptoms.    Warm up for 5-10 minutes before and after sports or exercise.     Other Triggers of Asthma  Cold air:  Cover your nose and mouth with a scarf.  Sometimes laughing or crying can be a trigger.  Some medicines and food can trigger asthma.

## 2021-09-21 NOTE — PATIENT INSTRUCTIONS
Patient Education   Personalized Prevention Plan  You are due for the preventive services outlined below.  Your care team is available to assist you in scheduling these services.  If you have already completed any of these items, please share that information with your care team to update in your medical record.  Health Maintenance Due   Topic Date Due     ANNUAL REVIEW OF HM ORDERS  Never done     Asthma Action Plan - yearly  Never done     Mammogram  07/21/2021     Flu Vaccine (1) 09/01/2021       Understanding USDA MyPlate  The USDA has guidelines to help you make healthy food choices. These are called MyPlate. MyPlate shows the food groups that make up healthy meals using the image of a place setting. Before you eat, think about the healthiest choices for what to put on your plate or in your cup or bowl. To learn more about building a healthy plate, visit www.wunderloopplate.gov.    The food groups    Fruits. Any fruit or 100% fruit juice counts as part of the Fruit Group. Fruits may be fresh, canned, frozen, or dried, and may be whole, cut-up, or pureed. Make 1/2 of your plate fruits and vegetables.    Vegetables. Any vegetable or 100% vegetable juice counts as a member of the Vegetable Group. Vegetables may be fresh, frozen, canned, or dried. They can be served raw or cooked and may be whole, cut-up, or mashed. Make 1/2 of your plate fruits and vegetables.    Grains. All foods made from grains are part of the Grains Group. These include wheat, rice, oats, cornmeal, and barley. Grains are often used to make foods such as bread, pasta, oatmeal, cereal, tortillas, and grits. Grains should be no more than 1/4 of your plate. At least half of your grains should be whole grains.    Protein. This group includes meat, poultry, seafood, beans and peas, eggs, processed soy products (such as tofu), nuts (including nut butters), and seeds. Make protein choices no more than 1/4 of your plate. Meat and poultry choices should  be lean or low fat.    Dairy. The Dairy Group includes all fluid milk products and foods made from milk that contain calcium, such as yogurt and cheese. (Foods that have little calcium, such as cream, butter, and cream cheese, are not part of this group.) Most dairy choices should be low-fat or fat-free.    Oils. Oils aren't a food group, but they do contain essential nutrients. However it's important to watch your intake of oils. These are fats that are liquid at room temperature. They include canola, corn, olive, soybean, vegetable, and sunflower oil. Foods that are mainly oil include mayonnaise, certain salad dressings, and soft margarines. You likely already get your daily oil allowance from the foods you eat.  Things to limit  Eating healthy also means limiting these things in your diet:       Salt (sodium). Many processed foods have a lot of sodium. To keep sodium intake down, eat fresh vegetables, meats, poultry, and seafood when possible. Purchase low-sodium, reduced-sodium, or no-salt-added food products at the store. And don't add salt to your meals at home. Instead, season them with herbs and spices such as dill, oregano, cumin, and paprika. Or try adding flavor with lemon or lime zest and juice.    Saturated fat. Saturated fats are most often found in animal products such as beef, pork, and chicken. They are often solid at room temperature, such as butter. To reduce your saturated fat intake, choose leaner cuts of meat and poultry. And try healthier cooking methods such as grilling, broiling, roasting, or baking. For a simple lower-fat swap, use plain nonfat yogurt instead of mayonnaise when making potato salad or macaroni salad.    Added sugars. These are sugars added to foods. They are in foods such as ice cream, candy, soda, fruit drinks, sports drinks, energy drinks, cookies, pastries, jams, and syrups. Cut down on added sugars by sharing sweet treats with a family member or friend. You can also  choose fruit for dessert, and drink water or other unsweetened beverages.     [employer] last reviewed this educational content on 6/1/2020 2000-2021 [employer]. All rights reserved. This information is not intended as a substitute for professional medical care. Always follow your healthcare professional's instructions.        Activities of Daily Living    Your Health Risk Assessment indicates you have difficulties with activities of daily living such as housework, bathing, preparing meals, taking medication, etc. Please make a follow up appointment for us to address this issue in more detail.    Signs of Hearing Loss      Hearing much better with one ear can be a sign of hearing loss.   Hearing loss is a problem shared by many people. In fact, it is one of the most common health problems, particularly as people age. Most people age 65 and older have some hearing loss. By age 80, almost everyone does. Hearing loss often occurs slowly over the years. So you may not realize your hearing has gotten worse.  Have your hearing checked  Call your healthcare provider if you:    Have to strain to hear normal conversation    Have to watch other people s faces very carefully to follow what they re saying    Need to ask people to repeat what they ve said    Often misunderstand what people are saying    Turn the volume of the television or radio up so high that others complain    Feel that people are mumbling when they re talking to you    Find that the effort to hear leaves you feeling tired and irritated    Notice, when using the phone, that you hear better with one ear than the other  [employer] last reviewed this educational content on 1/1/2020 2000-2021 [employer]. All rights reserved. This information is not intended as a substitute for professional medical care. Always follow your healthcare professional's instructions.          Preventing Falls at Home  A person can fall for many reasons.  Older adults may fall because reaction time slows down as we age. Your muscles and joints may get stiff, weak, or less flexible because of illness, medicines, or a physical condition.   Other health problems that make falls more likely include:     Arthritis    Dizziness or lightheadedness when you stand up (orthostatic hypotension)    History of a stroke    Dizziness    Anemia    Certain medicines taken for mental illness or to control blood pressure.    Problems with balance or gait    Bladder or urinary problems    History of falling    Changes in vision (vision impairment)    Changes in thinking skills and memory (cognitive impairment)  Injuries from a fall can include serious injuries such as broken bones, dislocated joints, internal bleeding and cuts. Injuries like these can limit your independence.   Prevention tips  To help prevent falls and fall-related injuries, follow the tips below.    Floors  To make floors safer:     Put nonskid pads under area rugs.    Remove small rugs.    Replace worn floor coverings.    Tack carpets firmly to each step on carpeted stairs. Put nonskid strips on the edges of uncarpeted stairs.    Keep floors and stairs free of clutter and cords.    Arrange furniture so there are clear pathways.    Clean up any spills right away.  Bathrooms    To make bathrooms safer:     Install grab bars in the tub or shower.    Apply nonskid strips or put a nonskid rubber mat in the tub or shower.    Sit on a bath chair to bathe.    Use bathmats with nonskid backing.  Lighting  To improve visibility in your home:      Keep a flashlight in each room. Or put a lamp next to the bed within easy reach.    Put nightlights in the bedrooms, hallways, kitchen, and bathrooms.    Make sure all stairways have good lighting.    Take your time when going up and down stairs.    Put handrails on both sides of stairs and in walkways for more support. To prevent injury to your wrist or arm, don t use handrails to  pull yourself up.    Install grab bars to pull yourself up.    Move or rearrange items that you use often. This will make them easier to find or reach.    Look at your home to find any safety hazards. Especially look at doorways, walkways, and the driveway. Remove or repair any safety problems that you find.  Other changes to make    Look around to find any safety hazards. Look closely at doorways, walkways, and the driveway. Remove or repair any safety problems that you find.    Wear shoes that fit well.    Take your time when going up and down stairs.    Put handrails on both sides of stairs and in walkways for more support. To prevent injury to your wrist or arm, don t use handrails to pull yourself up.    Install grab bars wherever needed to pull yourself up.    Arrange items that you use often. This will make them easier to find or reach.    [employer] last reviewed this educational content on 3/1/2020    1412-7261 [employer]. All rights reserved. This information is not intended as a substitute for professional medical care. Always follow your healthcare professional's instructions.

## 2021-09-21 NOTE — PROGRESS NOTES
"  ASSESSMENT / PLAN:   Mona was seen today for wellness visit.  controlled chronic issues with no change in management or complex decision making today      Diagnoses and all orders for this visit:    Encounter for Medicare annual wellness exam  -     INFLUENZA, QUAD, HIGH DOSE, PF, 65YR + (FLUZONE HD)    Intermittent asthma with reliever use up to twice per week, uncomplicated  -     albuterol (PROAIR HFA/PROVENTIL HFA/VENTOLIN HFA) 108 (90 Base) MCG/ACT inhaler; [ALBUTEROL (PROAIR HFA) 90 MCG/ACTUATION INHALER] INHALE 2 PUFFS BY MOUTH EVERY 4 HOURS AS NEEDED    Vitamin D deficiency  -     cholecalciferol 25 MCG (1000 UT) TABS; Take 1,000 Units by mouth daily  -     Vitamin D Deficiency    Other iron deficiency anemia  -     ferrous sulfate (FEROSUL) 325 (65 Fe) MG tablet; [FERROUS SULFATE 325 (65 FE) MG TABLET] take 1 tablet (325 mg) by oral route 3 times per day  -     CBC with platelets    Pre-diabetes  -     Hemoglobin A1c    Other orders  -     REVIEW OF HEALTH MAINTENANCE PROTOCOL ORDERS        Patient has been advised of split billing requirements and indicates understanding: Yes  COUNSELING:  Reviewed preventive health counseling, as reflected in patient instructions       regular sleep       Healthy diet/nutrition    Estimated body mass index is 37.28 kg/m  as calculated from the following:    Height as of this encounter: 1.499 m (4' 11\").    Weight as of this encounter: 83.7 kg (184 lb 9.6 oz).    Weight management plan: this is difficult because Mona lives independently but also has intellectual disabilities and does not necessarily follow good advice    She reports that she has never smoked. She has never used smokeless tobacco.      Appropriate preventive services were discussed with this patient, including applicable screening as appropriate for cardiovascular disease, diabetes, osteopenia/osteoporosis, and glaucoma.  As appropriate for age/gender, discussed screening for colorectal cancer, prostate " "cancer, breast cancer, and cervical cancer. Checklist reviewing preventive services available has been given to the patient.    Reviewed patients plan of care and provided an AVS. The Intermediate Care Plan ( asthma action plan, low back pain action plan, and migraine action plan) for Mona meets the Care Plan requirement. This Care Plan has been established and reviewed with the Patient and caregiver.    Counseling Resources:  ATP IV Guidelines  Pooled Cohorts Equation Calculator  Breast Cancer Risk Calculator  Breast Cancer: Medication to Reduce Risk  FRAX Risk Assessment  ICSI Preventive Guidelines  Dietary Guidelines for Americans, 2010  [employer] MyPlate  ASA Prophylaxis  Lung CA Screening    Anjali Navarro MD  [facility]    Identified Health Risks:    The patient was counseled and encouraged to consider modifying their diet and eating habits. She was provided with information on recommended healthy diet options.  The patient reports that she has difficulty with activities of daily living. She works with an [employer] provider  She is at risk for falling and has been provided with information to reduce the risk of falling at home.    SUBJECTIVE:   Mona Barnett is a 70 year old female who presents for Preventive Visit.    Guillermina is her ILS worker and accompanies Mona on today's visit      Patient has been advised of split billing requirements and indicates understanding: Yes   Are you in the first 12 months of your Medicare coverage?  No    Healthy Habits:     In general, how would you rate your overall health?  Good    Frequency of exercise:  2-3 days/week    Duration of exercise:  Less than 15 minutes    Do you usually eat at least 4 servings of fruit and vegetables a day, include whole grains    & fiber and avoid regularly eating high fat or \"junk\" foods?  No    Taking medications regularly:  Not Applicable    Medication side effects:  Not applicable    " Ability to successfully perform activities of daily living:  Telephone requires assistance, housework requires assistance and bathing requires assistance    Home Safety:  No safety concerns identified    Hearing Impairment:  Difficulty following a conversation in a noisy restaurant or crowded room and difficulty understanding soft or whispered speech    In the past 6 months, have you been bothered by leaking of urine?  No    In general, how would you rate your overall mental or emotional health?  Good      PHQ-2 Total Score: 0    Additional concerns today:  No    Ongoing issues    Guillermina says Mona has beginning of macular degeneration and beginning of cataracts - they are only doing afternoon appointments at her ophthalmologists office she is supposed to have a Follow up exam done.  Has to call [facility] in [place] to get her scheduled    Asthma   -- Mona uses albuterol when she goes to the fair or goes to the gym and with walking, and occasionally  Otherwise.  She uses it once or twice a day as needed.  Guillermina notes that Mona is not always compliant with her medications, so having her use a scheduled inhaler might not work   - thinks she uses it more than once a day   - I can call pharmacist to find out about use - she has Last has 194 inhalations left    Incontinence? Mona says she wears disposable absorbent underwear.  Guillermina was not aware of this but Mona shows this to us.  Mona says sometimes she does not make it to the bathroom. Guillermina says people bring supplies to Mona's apartment complex and people can take as they need?    Sleep habits - disorganized. Mona tends to fall asleep on the couch, just like her Mom.  Sometimes she wakes up early and just stays awake.  She does not have a certain time she goes to bed or wakes up, but she seems to function - including at work - nonetheless    On Exam Mona noted breast pain and  previous surgery on breast.  Guillermina was to aware but this  is on her surgical history profile. I found a date      Surgical History      Surgery Date Site/Laterality Comments   CHOLECYSTECTOMY          HYSTERECTOMY          (IA) PREVENTIVE REVIEW LIGATE FALLOPIAN TUBE          BREAST LUMPECTOMY 3/11/2008   right           Do you feel safe in your environment? Yes    Have you ever done Advance Care Planning? (For example, a Health Directive, POLST, or a discussion with a medical provider or your loved ones about your wishes): Yes, advance care planning is on file.       Fall risk  Fallen 2 or more times in the past year?: Yes  Any fall with injury in the past year?: No  Timed Up and Go Test (>13.5 is fall risk; contact physician) : 10    Cognitive Screening   1) Repeat 3 items (Leader, Season, Table)    2) Clock draw: ABNORMAL   3) 3 item recall: Recalls 3 objects  Results: above - patient has known cognitive impairment    Mini-CogTM Copyright S Bea. Licensed by the author for use in [facility]; reprinted with permission (juan@[facility]). All rights reserved.      Do you have sleep apnea, excessive snoring or daytime drowsiness?: unknown    Reviewed and updated as needed this visit by clinical staff  Tobacco  Allergies   Problems             Reviewed and updated as needed this visit by Provider     Problems            Social History     Tobacco Use     Smoking status: Never Smoker     Smokeless tobacco: Never Used   Substance Use Topics     Alcohol use: No         Alcohol Use 9/21/2021   Prescreen: >3 drinks/day or >7 drinks/week? No     Current providers sharing in care for this patient include:   Patient Care Team:  Anjali Navarro MD as PCP - Nadine Carrasquillo as Lead Care Coordinator (Primary Care - CC)  Anjali Navarro MD as Assigned PCP    The following health maintenance items are reviewed in Epic and correct as of today:  Health Maintenance Due   Topic Date Due     MAMMO SCREENING  07/21/2021         Any new diagnosis of  "family breast, ovarian, or bowel cancer? No    FHS-7:   Breast CA Risk Assessment (FHS-7) 7/26/2021   Did any of your first-degree relatives have breast or ovarian cancer? Unknown   Did any of your relatives have bilateral breast cancer? Unknown   Did any man in your family have breast cancer? Unknown   Did any woman in your family have breast and ovarian cancer? Unknown   Did any woman in your family have breast cancer before age 50 y? Unknown   Do you have 2 or more relatives with breast and/or ovarian cancer? Unknown   Do you have 2 or more relatives with breast and/or bowel cancer? Unknown     Review of Systems   - no chest pain   - no other pains   - no shortness of breath   - no digestive issues    OBJECTIVE:   /72 (BP Location: Left arm, Patient Position: Sitting, Cuff Size: Adult Large)   Pulse 60   Ht 1.499 m (4' 11\")   Wt 83.7 kg (184 lb 9.6 oz)   SpO2 97%   BMI 37.28 kg/m   Estimated body mass index is 37.28 kg/m  as calculated from the following:    Height as of this encounter: 1.499 m (4' 11\").    Weight as of this encounter: 83.7 kg (184 lb 9.6 oz).  Physical Exam  General appearance - alert, well appearing, and in no distress  Mental status - normal mood, behavior, speech, dress, motor activity, and thought processes  Eyes - pupils equal and reactive, extraocular eye movements intact, funduscopic exam normal, discs flat and sharp  Ears - bilateral TM's and external ear canals normal  Mouth - mucous membranes moist, pharynx normal without lesions  Neck - supple, no significant adenopathy, carotids upstroke normal bilaterally, no bruits, thyroid exam: thyroid is normal in size without nodules or tenderness  Chest - clear to auscultation, no wheezes, rales or rhonchi, symmetric air entry  Heart - normal rate, regular rhythm, normal S1, S2, no murmurs, rubs, clicks or gallops  Abdomen - soft, nontender, nondistended, no masses or organomegaly  Breasts - breasts appear normal, no suspicious " masses, no skin or nipple changes or axillary nodes  Neurological - alert, oriented, normal speech, no focal findings or movement disorder noted, DTR's normal and symmetric  Extremities - peripheral pulses normal, no pedal edema, no clubbing or cyanosis; toenails thick on left side only - Mona says they have always been this way  Skin - no rashes or worrisome lesions - linear dark horizontal lesions on back. Varying lengths.  Suggests vertical surface with drops of water running down

## 2021-09-22 LAB — DEPRECATED CALCIDIOL+CALCIFEROL SERPL-MC: 35 UG/L (ref 30–80)

## 2021-11-15 ENCOUNTER — NURSE TRIAGE (OUTPATIENT)
Dept: FAMILY MEDICINE | Facility: CLINIC | Age: 71
End: 2021-11-15
Payer: COMMERCIAL

## 2021-11-23 ENCOUNTER — OFFICE VISIT (OUTPATIENT)
Dept: FAMILY MEDICINE | Facility: CLINIC | Age: 71
End: 2021-11-23
Payer: MEDICARE

## 2021-11-23 VITALS
WEIGHT: 187.25 LBS | HEART RATE: 63 BPM | SYSTOLIC BLOOD PRESSURE: 140 MMHG | BODY MASS INDEX: 37.82 KG/M2 | DIASTOLIC BLOOD PRESSURE: 76 MMHG | OXYGEN SATURATION: 97 %

## 2021-11-23 DIAGNOSIS — R07.89 ATYPICAL CHEST PAIN: Primary | ICD-10-CM

## 2021-11-23 DIAGNOSIS — D64.9 LOW HEMOGLOBIN: ICD-10-CM

## 2021-11-23 DIAGNOSIS — Z23 HIGH PRIORITY FOR 2019-NCOV VACCINE: ICD-10-CM

## 2021-11-23 PROCEDURE — 99213 OFFICE O/P EST LOW 20 MIN: CPT | Mod: 25 | Performed by: FAMILY MEDICINE

## 2021-11-23 PROCEDURE — 91300 COVID-19,PF,PFIZER (12+ YRS): CPT | Performed by: FAMILY MEDICINE

## 2021-11-23 PROCEDURE — 0004A COVID-19,PF,PFIZER (12+ YRS): CPT | Performed by: FAMILY MEDICINE

## 2021-11-23 NOTE — PROGRESS NOTES
Assessment and Plan    Atypical chest pain  - NM Lexiscan stress test    Low hemoglobin  This might be difficult for her to do, but I want to at least try  - Immunos occult blood    High priority for 2019-nCoV vaccine  - COVID-19,PF,PFIZER (12+ Yrs PURPLE LABEL)     Return in about 10 months (around 2022) for medicare annual wellness, or earlier as needed.    Anjali Navarro MD     -------------------------------------------    Chief concern: I had Mona come back with her ILS worker Guillermina to discuss her low hemoglobin    While Guillermina is relatively new for Mona, her understanding is that Mona is taking Iron pills for a long time, though it is not clear whether Mona is good about taking them. Guillermina found that Mona had some  iron supplement, and got her new  Supplements. She says Mona is not always reliable with her medications    Mona has not notices any black stools. Guillermina says Mona is supposed to have a colonoscopy every 5 years. Last by [facility] 10/31/18 (see link in HM) 1 polyp found    I ask Mona some questions, but not sure she understands nor has a good concept of time or frequency    Short of breath with exercise?   - walked on treadmill yesterday and was fine   - says sometimes short of breath walking home from work    ALSO she says she is sitting on her couch watching TV sometimes and she has pain in her left chest - it comes very fast and she always takes a few breaths.Comes  and goes.  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 May last an hour    troponin negative in EMERGENCY DEPARTMENT in 2018    She also says she got some diet pills. I warned her against these        Current Outpatient Medications   Medication     albuterol (PROAIR HFA/PROVENTIL HFA/VENTOLIN HFA) 108 (90 Base) MCG/ACT inhaler     cholecalciferol 25 MCG (1000 UT) TABS     ferrous sulfate (FEROSUL) 325 (65 Fe) MG tablet     polyethylene glycol-propylene glycol (SYSTANE ULTRA) 0.4-0.3  % SOLN ophthalmic solution     No current facility-administered medications for this visit.         There are no preventive care reminders to display for this patient.  Health Maintenance reviewed - .    The history section was last reviewed by Kajal Vital on Nov 23, 2021.    History   Smoking Status     Never Smoker   Smokeless Tobacco     Never Used       Social History    Substance and Sexual Activity      Alcohol use: No        BP (!) 140/76   Pulse 63   Wt 84.9 kg (187 lb 4 oz)   SpO2 97%   BMI 37.82 kg/m    Body mass index is 37.82 kg/m .     EXAM:    General appearance - alert, well appearing, and in no distress  Mental status - in good spirits

## 2021-12-06 ENCOUNTER — HOSPITAL ENCOUNTER (OUTPATIENT)
Dept: NUCLEAR MEDICINE | Facility: HOSPITAL | Age: 71
End: 2021-12-06
Attending: FAMILY MEDICINE
Payer: MEDICARE

## 2021-12-06 DIAGNOSIS — R07.89 ATYPICAL CHEST PAIN: ICD-10-CM

## 2021-12-06 PROCEDURE — A9500 TC99M SESTAMIBI: HCPCS | Performed by: FAMILY MEDICINE

## 2021-12-06 PROCEDURE — 93018 CV STRESS TEST I&R ONLY: CPT | Performed by: INTERNAL MEDICINE

## 2021-12-06 PROCEDURE — 343N000001 HC RX 343: Performed by: FAMILY MEDICINE

## 2021-12-06 PROCEDURE — 78452 HT MUSCLE IMAGE SPECT MULT: CPT | Mod: 26 | Performed by: INTERNAL MEDICINE

## 2021-12-06 PROCEDURE — 78452 HT MUSCLE IMAGE SPECT MULT: CPT

## 2021-12-06 RX ADMIN — Medication 9.65 MCI.: at 09:00

## 2021-12-13 ENCOUNTER — PATIENT OUTREACH (OUTPATIENT)
Dept: GERIATRIC MEDICINE | Facility: CLINIC | Age: 71
End: 2021-12-13

## 2021-12-13 ENCOUNTER — HOSPITAL ENCOUNTER (OUTPATIENT)
Dept: CARDIOLOGY | Facility: HOSPITAL | Age: 71
End: 2021-12-13
Attending: FAMILY MEDICINE
Payer: MEDICARE

## 2021-12-13 ENCOUNTER — HOSPITAL ENCOUNTER (OUTPATIENT)
Dept: NUCLEAR MEDICINE | Facility: HOSPITAL | Age: 71
End: 2021-12-13
Attending: FAMILY MEDICINE
Payer: MEDICARE

## 2021-12-13 LAB
CV STRESS CURRENT BP HE: NORMAL
CV STRESS CURRENT HR HE: 58
CV STRESS CURRENT HR HE: 61
CV STRESS CURRENT HR HE: 63
CV STRESS CURRENT HR HE: 70
CV STRESS CURRENT HR HE: 71
CV STRESS CURRENT HR HE: 74
CV STRESS CURRENT HR HE: 75
CV STRESS CURRENT HR HE: 75
CV STRESS CURRENT HR HE: 77
CV STRESS CURRENT HR HE: 77
CV STRESS CURRENT HR HE: 78
CV STRESS CURRENT HR HE: 80
CV STRESS CURRENT HR HE: 84
CV STRESS CURRENT HR HE: 86
CV STRESS CURRENT HR HE: 87
CV STRESS CURRENT HR HE: 87
CV STRESS DEVIATION TIME HE: NORMAL
CV STRESS ECHO PERCENT HR HE: NORMAL
CV STRESS EXERCISE STAGE HE: NORMAL
CV STRESS EXERCISE STAGE REACHED HE: NORMAL
CV STRESS FINAL RESTING BP HE: NORMAL
CV STRESS FINAL RESTING HR HE: 77
CV STRESS MAX HR HE: 87
CV STRESS MAX TREADMILL GRADE HE: 0
CV STRESS MAX TREADMILL SPEED HE: 0
CV STRESS PEAK DIA BP HE: NORMAL
CV STRESS PEAK SYS BP HE: NORMAL
CV STRESS PHASE HE: NORMAL
CV STRESS PROTOCOL HE: NORMAL
CV STRESS REASON STOPPED HE: NORMAL
CV STRESS RESTING PT POSITION HE: NORMAL
CV STRESS ST DEVIATION AMOUNT HE: NORMAL
CV STRESS ST DEVIATION ELEVATION HE: NORMAL
CV STRESS ST EVELATION AMOUNT HE: NORMAL
CV STRESS SYMPTOMS HE: NORMAL
CV STRESS TEST TYPE HE: NORMAL
CV STRESS TOTAL STAGE TIME MIN 1 HE: NORMAL
STRESS ECHO BASELINE DIASTOLIC HE: 67
STRESS ECHO BASELINE HR: 60
STRESS ECHO BASELINE SYSTOLIC BP: 149
STRESS ECHO LAST STRESS DIASTOLIC BP: 81
STRESS ECHO LAST STRESS HR: 75
STRESS ECHO LAST STRESS SYSTOLIC BP: 155
STRESS ECHO TARGET HR: 128

## 2021-12-13 PROCEDURE — 250N000011 HC RX IP 250 OP 636: Performed by: FAMILY MEDICINE

## 2021-12-13 PROCEDURE — 343N000001 HC RX 343: Performed by: FAMILY MEDICINE

## 2021-12-13 PROCEDURE — 93016 CV STRESS TEST SUPVJ ONLY: CPT | Performed by: INTERNAL MEDICINE

## 2021-12-13 PROCEDURE — 93017 CV STRESS TEST TRACING ONLY: CPT | Performed by: INTERNAL MEDICINE

## 2021-12-13 PROCEDURE — A9500 TC99M SESTAMIBI: HCPCS | Performed by: FAMILY MEDICINE

## 2021-12-13 RX ORDER — AMINOPHYLLINE 25 MG/ML
50 INJECTION, SOLUTION INTRAVENOUS
Status: DISCONTINUED | OUTPATIENT
Start: 2021-12-13 | End: 2021-12-13 | Stop reason: HOSPADM

## 2021-12-13 RX ORDER — ALBUTEROL SULFATE 0.83 MG/ML
2.5 SOLUTION RESPIRATORY (INHALATION)
Status: DISCONTINUED | OUTPATIENT
Start: 2021-12-13 | End: 2021-12-13 | Stop reason: HOSPADM

## 2021-12-13 RX ORDER — CAFFEINE CITRATE 20 MG/ML
60 SOLUTION INTRAVENOUS
Status: DISCONTINUED | OUTPATIENT
Start: 2021-12-13 | End: 2021-12-13 | Stop reason: HOSPADM

## 2021-12-13 RX ORDER — REGADENOSON 0.08 MG/ML
0.4 INJECTION, SOLUTION INTRAVENOUS ONCE
Status: COMPLETED | OUTPATIENT
Start: 2021-12-13 | End: 2021-12-13

## 2021-12-13 RX ORDER — CAFFEINE 200 MG
200 TABLET ORAL
Status: DISCONTINUED | OUTPATIENT
Start: 2021-12-13 | End: 2021-12-13 | Stop reason: HOSPADM

## 2021-12-13 RX ADMIN — REGADENOSON 0.4 MG: 0.08 INJECTION, SOLUTION INTRAVENOUS at 09:13

## 2021-12-13 RX ADMIN — Medication 32.7 MILLICURIE: at 09:10

## 2021-12-13 ASSESSMENT — ACTIVITIES OF DAILY LIVING (ADL): DEPENDENT_IADLS:: CLEANING;COOKING;SHOPPING;MEAL PREPARATION;MEDICATION MANAGEMENT;MONEY MANAGEMENT;TRANSPORTATION

## 2021-12-13 NOTE — PROGRESS NOTES
[facility] Care Coordination Contact  [facility] CCDB Assessment   (for members on other waivers)    Home visit for Health Risk Assessment with Mona Barnett completed on December 13, 2021    Type of residence:: Apartment  Current living arrangement:: I live alone     Assessment completed with:: Patient,Care Team Member    Current Care Plan  Member is a recipient of the DD Waiver program.  Member currently receiving the following home care services:     Member currently receiving the following community resources: [facility],Other (see comment)      Medication Review  Medication reconciliation completed in Epic: Yes  Medication set-up & administration: Family/informal caregiver sets up weekly.  Self-administers medications.  Medication Risk Assessment Medication (1 or more, place referral to MTM): N/A: No risk factors identified  MTM Referral Placed: No: No risk factors idenified    Mental/Behavioral Health   Depression Screening:   PHQ-2 Total Score (Adult) - Positive if 3 or more points; Administer PHQ-9 if positive: 0       Mental health DX:: No        Falls Assessment:   Fallen 2 or more times in the past year?: No   Any fall with injury in the past year?: No    ADL/IADL Dependencies:   Dependent ADLs:: Independent  Dependent IADLs:: Cleaning,Cooking,Shopping,Meal Preparation,Medication Management,Money Management,Transportation    [facility] sponsored benefits: Shared information re: Silver Sneakers/gym memberships, ASA, Calcium +D.    PCA Assessment completed at visit: Not Applicable      Care Plan & Recommendations: Mona feels things are going well and is happy with her current services through her DD waiver. She feels her needs are met and had no needs from care coordinator at this time.     DD Care Plan/ISP requested from waiver .     Follow-Up Plan: Member informed of future contact, plan to f/u with member with a 6 month telephone assessment.  Contact information  shared with member and family, encouraged member to call with any questions or concerns at any time.    Nadine iHggins LG, Manager   [facility]  [phone]

## 2021-12-20 ENCOUNTER — PATIENT OUTREACH (OUTPATIENT)
Dept: GERIATRIC MEDICINE | Facility: CLINIC | Age: 71
End: 2021-12-20
Payer: COMMERCIAL

## 2021-12-20 NOTE — PROGRESS NOTES
[facility] Care Coordination Contact    Received after visit chart from care coordinator.  Completed following tasks: Mailed copy of care plan to client. CC mailed POC plus POC Sig to member sign and return.    Edmond John  Care Management Specialist  [facility]  [phone]

## 2021-12-20 NOTE — LETTER
December 20, 2021    DEON ZIMMER  [address]   [place] [address]        Dear Deon:    At [facility], we are dedicated to improving your health and well-being. Enclosed is the Comprehensive Care Plan that we developed with you on 12/13/21. Please review the Care Plan carefully.    As a reminder, some of the things we discussed at your visit include:    Your physical and mental health    Ways to reduce falls    Health care needs you may have    Don t forget to contact your care coordinator if you:    Have been hospitalized or plan to be hospitalized     Have had a fall     Have experienced a change in physical health    Are experiencing emotional problems     If you do not agree with your Care Plan, have questions about it, or have experienced a change in your needs, please call me at [phone]. If you are hearing impaired, please call the [place] [employer] at 071 or [phone] ([email or web address]-[email or web address] relay service).    Sincerely,        BALJINDER Gonsales    E-mail: [email or web address]  Phone: [phone]      [facility]+[id number] [place] ([id number])     [id number] (11/18)

## 2022-01-18 ENCOUNTER — OFFICE VISIT (OUTPATIENT)
Dept: FAMILY MEDICINE | Facility: CLINIC | Age: 72
End: 2022-01-18
Payer: MEDICARE

## 2022-01-18 VITALS
OXYGEN SATURATION: 96 % | HEART RATE: 78 BPM | BODY MASS INDEX: 38.38 KG/M2 | DIASTOLIC BLOOD PRESSURE: 70 MMHG | WEIGHT: 190 LBS | SYSTOLIC BLOOD PRESSURE: 144 MMHG

## 2022-01-18 DIAGNOSIS — R07.89 ATYPICAL CHEST PAIN: Primary | ICD-10-CM

## 2022-01-18 DIAGNOSIS — Z13.220 LIPID SCREENING: ICD-10-CM

## 2022-01-18 DIAGNOSIS — R94.39 ABNORMAL CARDIOVASCULAR STRESS TEST: ICD-10-CM

## 2022-01-18 LAB
CHOLEST SERPL-MCNC: 155 MG/DL
FASTING STATUS PATIENT QL REPORTED: YES
HDLC SERPL-MCNC: 53 MG/DL
LDLC SERPL CALC-MCNC: 93 MG/DL
TRIGL SERPL-MCNC: 44 MG/DL

## 2022-01-18 PROCEDURE — 80061 LIPID PANEL: CPT | Performed by: FAMILY MEDICINE

## 2022-01-18 PROCEDURE — 99212 OFFICE O/P EST SF 10 MIN: CPT | Performed by: FAMILY MEDICINE

## 2022-01-18 PROCEDURE — 36415 COLL VENOUS BLD VENIPUNCTURE: CPT | Performed by: FAMILY MEDICINE

## 2022-01-18 RX ORDER — MULTIPLE VITAMINS W/ MINERALS TAB 9MG-400MCG
1 TAB ORAL DAILY
COMMUNITY

## 2022-01-18 NOTE — PROGRESS NOTES
Assessment and Plan    Atypical chest pain  Reason for ordering Lexiscan stress test, however results     Abnormal cardiovascular stress test: -     Result Text     The nuclear stress test is equivocal.     Perfusion images suggest a partially reversible perfusion abnormality   in the anterior and anterolateral wall. Significant breast attenuation is   noted, which may account for the perfusion defect. Ischemia of the   anterior wall cannot be excluded.     The left ventricular ejection fraction at stress is 63%.     There is no prior study for comparison    Because the equivocal results, Dr. Russell recommended:    - CT Angiogram coronary artery        Lipid screening  Explained high lipid could lead to blockage in arteries, so also part of work up for chest pain  - Lipid Profile (Chol, Trig, HDL, LDL calc)         Return for pending test results.    Anjali Navarro MD     -------------------------------------------    Chief concern: I asked Mona to come in today with Guillermina, her ILS worker. Main focus of today visit was explaining in simple terms    - again why her chest pain lead me to order the Lexiscan test   - What the Lexiscan test shows - possibly abnormal FUNCTION   - what a CT angiogram is, and why I am ordering this now to see if the abnormal function correlates with abnormal STRUCTURE - if there an plaque inside her arteries    Current Outpatient Medications   Medication     albuterol (PROAIR HFA/PROVENTIL HFA/VENTOLIN HFA) 108 (90 Base) MCG/ACT inhaler     cholecalciferol 25 MCG (1000 UT) TABS     ferrous sulfate (FEROSUL) 325 (65 Fe) MG tablet     multivitamin w/minerals (MULTI-VITAMIN) tablet     polyethylene glycol-propylene glycol (SYSTANE ULTRA) 0.4-0.3 % SOLN ophthalmic solution     No current facility-administered medications for this visit.       Mona is also due for an asthma control test. What is really going on is a little unclear - she seems to get winded with exercise and albuterol can  help, but she cannot tell me much about how often she uses it.         There are no preventive care reminders to display for this patient.  Health Maintenance reviewed -     The history section was last reviewed by Billie Vogel on Jan 18, 2022.    History   Smoking Status     Never Smoker   Smokeless Tobacco     Never Used       Social History    Substance and Sexual Activity      Alcohol use: No        BP (!) 144/70   Pulse 78   Wt 86.2 kg (190 lb)   SpO2 96%   BMI 38.38 kg/m    Body mass index is 38.38 kg/m .     EXAM:    General appearance - alert, well appearing, and in no distress  Mental status - pleasant, coopertive, but sometimes hard to know how much she understands

## 2022-01-18 NOTE — LETTER
January 27, 2022      Mona Barnett  [address]   [place] [address]        Dear MsJerry,    We are writing to inform you of your test results.    Mona your cholesterol panel looks fantastic!       Resulted Orders   Lipid Profile (Chol, Trig, HDL, LDL calc)   Result Value Ref Range    Cholesterol 155 <=199 mg/dL    Triglycerides 44 <=149 mg/dL    Direct Measure HDL 53 >=50 mg/dL      Comment:      HDL Cholesterol Reference Range:     0-2 years:   No reference ranges established for patients under 2 years old  at [facility]anael Ulien for lipid analytes.    2-8 years:  Greater than 45 mg/dL     18 years and older:   Female: Greater than or equal to 50 mg/dL   Male:   Greater than or equal to 40 mg/dL    LDL Cholesterol Calculated 93 <=129 mg/dL    Patient Fasting > 8hrs? Yes        If you have any questions or concerns, please call the clinic at the number listed above.       Sincerely,      Anjali Naavrro MD

## 2022-01-19 ASSESSMENT — ASTHMA QUESTIONNAIRES: ACT_TOTALSCORE: 22

## 2022-02-22 ENCOUNTER — OFFICE VISIT (OUTPATIENT)
Dept: FAMILY MEDICINE | Facility: CLINIC | Age: 72
End: 2022-02-22
Payer: MEDICARE

## 2022-02-22 VITALS
WEIGHT: 191 LBS | DIASTOLIC BLOOD PRESSURE: 62 MMHG | HEART RATE: 61 BPM | BODY MASS INDEX: 38.51 KG/M2 | OXYGEN SATURATION: 98 % | HEIGHT: 59 IN | SYSTOLIC BLOOD PRESSURE: 140 MMHG

## 2022-02-22 DIAGNOSIS — L30.4 INTERTRIGO: Primary | ICD-10-CM

## 2022-02-22 PROCEDURE — 99213 OFFICE O/P EST LOW 20 MIN: CPT | Performed by: FAMILY MEDICINE

## 2022-02-22 RX ORDER — NYSTATIN 100000 U/G
CREAM TOPICAL 2 TIMES DAILY
Qty: 30 G | Refills: 1 | Status: SHIPPED | OUTPATIENT
Start: 2022-02-22 | End: 2022-03-09

## 2022-02-22 RX ORDER — LIDOCAINE 50 MG/G
OINTMENT TOPICAL 4 TIMES DAILY PRN
Qty: 50 G | Refills: 1 | Status: SHIPPED | OUTPATIENT
Start: 2022-02-22 | End: 2022-09-27

## 2022-02-22 NOTE — PROGRESS NOTES
"Assessment and Plan    Intertrigo - left groin  This may have possibly gotten worse with her used of washcloth scrub and applying tissue with Vaseline,  Discussed Vaseline is OK, but not tissue - ointments can be used without a cover in this area. Also avoid scrubbing.   - lidocaine (XYLOCAINE) 5 % external ointment  Dispense: 50 g; Refill: 1 - described this was as needed for pain  - nystatin (MYCOSTATIN) 932516 UNIT/GM external cream  Dispense: 30 g; Refill: 1         Return if symptoms worsen or fail to improve, for  53 weeks form 9/21/21 for annual exam.    Anjali Navarro MD     -------------------------------------------    Chief concern:     \"Boil\" on left side of vulva. Both Mona and her ILS worked Guillermina who accompanies her today says she has had a boil before. Review of records show she was seen in [facility]  7/27/21 and had cyst lanced.  Here are exam notes from that visit : Skin:\" 2 cm area of erythema and induration right suprapubic area, small amount of purulent drainage, tender with palpation, no significant surrounding erythema.\"    Current issue has gone on for a couple of weeks.  She shows me that she has been putting Vaseline on tissue and applying this to the area of concern.    When I ask about her use of washcloth, sh dose say she scrubs in the are of concern      Current Outpatient Medications   Medication     albuterol (PROAIR HFA/PROVENTIL HFA/VENTOLIN HFA) 108 (90 Base) MCG/ACT inhaler     cholecalciferol 25 MCG (1000 UT) TABS     ferrous sulfate (FEROSUL) 325 (65 Fe) MG tablet     lidocaine (XYLOCAINE) 5 % external ointment     multivitamin w/minerals (MULTI-VITAMIN) tablet     nystatin (MYCOSTATIN) 014029 UNIT/GM external cream     polyethylene glycol-propylene glycol (SYSTANE ULTRA) 0.4-0.3 % SOLN ophthalmic solution     No current facility-administered medications for this visit.     There are no preventive care reminders to display for this patient.  Health Maintenance reviewed - " "    The history section was last reviewed by Gayla Raines on Feb 22, 2022.    History   Smoking Status     Never Smoker   Smokeless Tobacco     Never Used       Social History    Substance and Sexual Activity      Alcohol use: No        BP (!) 140/62 (BP Location: Left arm, Patient Position: Sitting, Cuff Size: Adult Small)   Pulse 61   Ht 1.499 m (4' 11\")   Wt 86.6 kg (191 lb)   SpO2 98%   BMI 38.58 kg/m    Body mass index is 38.58 kg/m .     EXAM:    General appearance - alert, well appearing, and in no distress  Mental status - normal mood, behavior, speech, dress, motor activity, and thought processes, due to intellectual disabilities she has a hard time with questions about medical history  Skin - Erythema and thickened skin of left groin with a few areas of skin breakdown. NO cysts or areas of fluctuance         Answers for HPI/ROS submitted by the patient on 2/22/2022  How many servings of fruits and vegetables do you eat daily?: 0-1  On average, how many sweetened beverages do you drink each day (Examples: soda, juice, sweet tea, etc.  Do NOT count diet or artificially sweetened beverages)?: 2  How many minutes a day do you exercise enough to make your heart beat faster?: 10 to 19  How many days a week do you exercise enough to make your heart beat faster?: 3 or less  How many days per week do you miss taking your medication?: 0  What is the reason for your visit today?: Boil  When did your symptoms begin?: 1-2 weeks ago  How would you describe these symptoms?: Mild  Are your symptoms:: Staying the same  Have you had these symptoms before?: Yes  Have you tried or received treatment for these symptoms before?: Yes  Did that treatment work? : Yes      "

## 2022-02-24 ENCOUNTER — TELEPHONE (OUTPATIENT)
Dept: FAMILY MEDICINE | Facility: CLINIC | Age: 72
End: 2022-02-24
Payer: MEDICARE

## 2022-02-24 NOTE — LETTER
February 25, 2022      RE: Mona Barnett  [address]   [place] [address]    To whom it may concern,    Mona Treviño was seen in clinic on 2/22/22 for pain in her left groin.  Exam revealed intertrigo with two areas of skin break down - I.e. open sores that were causing her pain. In addition to provider a prescription for the appropriate antifungal, I would like to provide her with pain relief while she is healing, as the is an area where there is naturally a lot of rubbing  .  I am writing to  urgently request authorization for the prescription I sent for lidocaine ointment 5% (or ever 4%) as this will provide her with quicker relief.    Sincerely,    Anjali Navarro MD  Family Medicine, [facility]

## 2022-02-24 NOTE — TELEPHONE ENCOUNTER
PA Initiation    Medication: lidocaine (XYLOCAINE) 5 % external ointment  Insurance Company: [employer] - Phone [phone] Fax [phone]  Pharmacy Filling the Rx: [id number] - [place], [place] - [address]  Filling Pharmacy Phone: [phone]  Filling Pharmacy Fax:    Start Date: 2/24/2022    Central Prior Authorization Team   Phone: [phone]

## 2022-02-24 NOTE — LETTER
February 25, 2022      Re: Mona Barnett  [address]   [place] [address]      To whom it may concern,    Mona Treviño was seen in clinic on 2/22/22 for pain in her left groin.  Exam revealed intertrigo with two areas of skin break down - I.e. open sores that were causing her pain. In addition to provider a prescription for the appropriate antifungal, I would like to provide her with pain relief while she is healing, as the is an area where there is naturally a lot of rubbing  .  EMLA (lidocaine-prilocaine cream) is not indicated for ongoing use     I am writing to  urgently request authorization for the prescription I sent for lidocaine ointment 5% (or ever 4%) as this will provide her with quicker relief. I would like for her to be able to apply this 3-4 times a day as needed       Sincerely,        Anjali Navarro MD

## 2022-02-25 NOTE — TELEPHONE ENCOUNTER
PRIOR AUTHORIZATION DENIED    Medication: lidocaine (XYLOCAINE) 5 % external ointment    Denial Date: 2/25/2022    Denial Rational: Patient must have a history of trial & failure to the formulary alternative(s) or have a contraindication or intolerance to the formulary alternatives: lidocaine-prilocaine topical cream        Appeal Information:

## 2022-02-25 NOTE — TELEPHONE ENCOUNTER
EMLA would not be appropriate.  I have not had difficulty getting palin lidocine ointment covered before, and so have written a letter (actually wrote tow, but the second mentions that EMLA would not be appropriate, so please tony that)

## 2022-02-26 NOTE — TELEPHONE ENCOUNTER
Medication Appeal Initiation    We have initiated an appeal for the requested medication:  Medication: lidocaine (XYLOCAINE) 5 % external ointment  Appeal Start Date:  2/26/2022  Insurance Company: [employer] - Phone [phone] Fax [phone]  Comments:  Appeal initiated with letter of medical necessity included and faxed to [employer] fax# [phone]

## 2022-03-02 NOTE — TELEPHONE ENCOUNTER
MEDICATION APPEAL APPROVED    Medication: lidocaine (XYLOCAINE) 5 % external ointment  Authorization Effective Date: 1/1/2022  Authorization Expiration Date: 12/31/2022  Approved Dose/Quantity:   Reference #: [id number]   Insurance Company: [employer] - Phone [phone] Fax [phone]   Which Pharmacy is filling the prescription (Not needed for infusion/clinic administered): [id number] - [place], [place] - [address]    Called [employer] - Phone [phone] Fax [phone] to check status of lidocaine (XYLOCAINE) 5 % external ointment appeal. Request was approved per rep, with dates 01/01/22-12/31/22 case# [id number]. Will document approval letter once received.

## 2022-03-08 ENCOUNTER — HOSPITAL ENCOUNTER (OUTPATIENT)
Dept: CT IMAGING | Facility: CLINIC | Age: 72
Discharge: HOME | End: 2022-03-08
Attending: FAMILY MEDICINE | Admitting: FAMILY MEDICINE
Payer: MEDICARE

## 2022-03-08 VITALS
HEIGHT: 59 IN | SYSTOLIC BLOOD PRESSURE: 126 MMHG | DIASTOLIC BLOOD PRESSURE: 65 MMHG | BODY MASS INDEX: 36.29 KG/M2 | WEIGHT: 180 LBS

## 2022-03-08 DIAGNOSIS — R94.39 ABNORMAL CARDIOVASCULAR STRESS TEST: ICD-10-CM

## 2022-03-08 DIAGNOSIS — R07.89 ATYPICAL CHEST PAIN: ICD-10-CM

## 2022-03-08 LAB
BSA FOR ECHO PROCEDURE: 1.76 M2
CCTA ASCENDING AORTA: 3.1
CCTA SINUS: 3
CREAT BLD-MCNC: 0.7 MG/DL (ref 0.6–1.1)
GFR SERPL CREATININE-BSD FRML MDRD: >60 ML/MIN/1.73M2

## 2022-03-08 PROCEDURE — 82565 ASSAY OF CREATININE: CPT

## 2022-03-08 PROCEDURE — 75574 CT ANGIO HRT W/3D IMAGE: CPT | Mod: MG

## 2022-03-08 PROCEDURE — G1004 CDSM NDSC: HCPCS | Performed by: GENERAL ACUTE CARE HOSPITAL

## 2022-03-08 PROCEDURE — 250N000011 HC RX IP 250 OP 636: Performed by: FAMILY MEDICINE

## 2022-03-08 PROCEDURE — 250N000013 HC RX MED GY IP 250 OP 250 PS 637: Performed by: FAMILY MEDICINE

## 2022-03-08 PROCEDURE — 75574 CT ANGIO HRT W/3D IMAGE: CPT | Mod: 26 | Performed by: GENERAL ACUTE CARE HOSPITAL

## 2022-03-08 RX ORDER — IOPAMIDOL 755 MG/ML
100 INJECTION, SOLUTION INTRAVASCULAR ONCE
Status: COMPLETED | OUTPATIENT
Start: 2022-03-08 | End: 2022-03-08

## 2022-03-08 RX ORDER — DILTIAZEM HYDROCHLORIDE 5 MG/ML
10 INJECTION INTRAVENOUS
Status: DISCONTINUED | OUTPATIENT
Start: 2022-03-08 | End: 2022-03-09 | Stop reason: HOSPADM

## 2022-03-08 RX ORDER — LIDOCAINE 40 MG/G
CREAM TOPICAL
Status: DISCONTINUED | OUTPATIENT
Start: 2022-03-08 | End: 2022-03-09 | Stop reason: HOSPADM

## 2022-03-08 RX ORDER — DILTIAZEM HYDROCHLORIDE 5 MG/ML
5-25 INJECTION INTRAVENOUS
Status: DISCONTINUED | OUTPATIENT
Start: 2022-03-08 | End: 2022-03-09 | Stop reason: HOSPADM

## 2022-03-08 RX ORDER — NITROGLYCERIN 0.4 MG/1
0.4 TABLET SUBLINGUAL ONCE
Status: COMPLETED | OUTPATIENT
Start: 2022-03-08 | End: 2022-03-08

## 2022-03-08 RX ORDER — METOPROLOL TARTRATE 1 MG/ML
5-20 INJECTION, SOLUTION INTRAVENOUS
Status: DISCONTINUED | OUTPATIENT
Start: 2022-03-08 | End: 2022-03-09 | Stop reason: HOSPADM

## 2022-03-08 RX ADMIN — IOPAMIDOL 100 ML: 755 INJECTION, SOLUTION INTRAVENOUS at 11:06

## 2022-03-08 RX ADMIN — NITROGLYCERIN 0.4 MG: 0.4 TABLET SUBLINGUAL at 11:03

## 2022-06-06 ENCOUNTER — PATIENT OUTREACH (OUTPATIENT)
Dept: GERIATRIC MEDICINE | Facility: CLINIC | Age: 72
End: 2022-06-06
Payer: MEDICARE

## 2022-06-06 NOTE — PROGRESS NOTES
[facility] Care Coordination Contact    Called ILS viktor Guillermina to complete six month assessment and left a message requesting a return call.    BALJINDER Gonsales, Manager   [facility]  [phone]

## 2022-06-14 NOTE — PROGRESS NOTES
[facility] Care Coordination Contact    TC to Mona's ILS worker Guillermina. She said they were busy today, but she will give me a call next Monday to conduct interim call.     BALJINDER Gonsales, Manager   [facility]  [phone]

## 2022-07-11 NOTE — PROGRESS NOTES
[facility] Care Coordination Contact      [facility] Six-Month Telephone Assessment    6 month telephone assessment completed on 7/11/22.    ER visits: No  Hospitalizations: No  TCU stays: No  Significant health status changes: None  Falls/Injuries: No  ADL/IADL changes: No  Changes in services: No    Caregiver Assessment follow up:  N/A    Goals: See POC in chart for goal progress documentation.      Will see member in 6 months for an annual health risk assessment.   Encouraged member to call CC with any questions or concerns in the meantime.     BALJINDER Gonsales, Manager   [facility]  [phone]

## 2022-08-02 ENCOUNTER — ANCILLARY PROCEDURE (OUTPATIENT)
Dept: MAMMOGRAPHY | Facility: CLINIC | Age: 72
End: 2022-08-02
Attending: FAMILY MEDICINE
Payer: MEDICARE

## 2022-08-02 DIAGNOSIS — Z12.31 VISIT FOR SCREENING MAMMOGRAM: ICD-10-CM

## 2022-08-02 PROCEDURE — 77067 SCR MAMMO BI INCL CAD: CPT | Mod: TC | Performed by: RADIOLOGY

## 2022-08-17 ENCOUNTER — PATIENT OUTREACH (OUTPATIENT)
Dept: GERIATRIC MEDICINE | Facility: CLINIC | Age: 72
End: 2022-08-17

## 2022-08-17 NOTE — PROGRESS NOTES
CC updated program tasks and targets for [employer] launch.    BALJINDER Gonsales, Manager   [facility]  [phone]

## 2022-09-27 ENCOUNTER — OFFICE VISIT (OUTPATIENT)
Dept: FAMILY MEDICINE | Facility: CLINIC | Age: 72
End: 2022-09-27
Payer: MEDICARE

## 2022-09-27 VITALS
WEIGHT: 186 LBS | BODY MASS INDEX: 37.5 KG/M2 | OXYGEN SATURATION: 97 % | SYSTOLIC BLOOD PRESSURE: 138 MMHG | HEIGHT: 59 IN | DIASTOLIC BLOOD PRESSURE: 70 MMHG | HEART RATE: 60 BPM

## 2022-09-27 DIAGNOSIS — M85.851 OSTEOPENIA OF BOTH HIPS: ICD-10-CM

## 2022-09-27 DIAGNOSIS — Z00.00 ENCOUNTER FOR MEDICARE ANNUAL WELLNESS EXAM: Primary | ICD-10-CM

## 2022-09-27 DIAGNOSIS — Z78.0 MENOPAUSE: ICD-10-CM

## 2022-09-27 DIAGNOSIS — Z23 HIGH PRIORITY FOR 2019-NCOV VACCINE: ICD-10-CM

## 2022-09-27 DIAGNOSIS — M85.852 OSTEOPENIA OF BOTH HIPS: ICD-10-CM

## 2022-09-27 DIAGNOSIS — Z23 IMMUNIZATION DUE: ICD-10-CM

## 2022-09-27 PROCEDURE — 0124A COVID-19,PF,PFIZER BOOSTER BIVALENT: CPT | Performed by: FAMILY MEDICINE

## 2022-09-27 PROCEDURE — 91312 COVID-19,PF,PFIZER BOOSTER BIVALENT: CPT | Performed by: FAMILY MEDICINE

## 2022-09-27 PROCEDURE — G0008 ADMIN INFLUENZA VIRUS VAC: HCPCS | Performed by: FAMILY MEDICINE

## 2022-09-27 PROCEDURE — 90662 IIV NO PRSV INCREASED AG IM: CPT | Performed by: FAMILY MEDICINE

## 2022-09-27 PROCEDURE — 90677 PCV20 VACCINE IM: CPT | Performed by: FAMILY MEDICINE

## 2022-09-27 PROCEDURE — G0009 ADMIN PNEUMOCOCCAL VACCINE: HCPCS | Performed by: FAMILY MEDICINE

## 2022-09-27 PROCEDURE — G0439 PPPS, SUBSEQ VISIT: HCPCS | Performed by: FAMILY MEDICINE

## 2022-09-27 ASSESSMENT — ENCOUNTER SYMPTOMS
DIARRHEA: 0
NAUSEA: 0
FEVER: 0
PARESTHESIAS: 0
FREQUENCY: 0
SORE THROAT: 0
ARTHRALGIAS: 0
HEMATURIA: 0
DIZZINESS: 0
NERVOUS/ANXIOUS: 0
HEADACHES: 0
EYE PAIN: 0
HEMATOCHEZIA: 0
DYSURIA: 0
BREAST MASS: 0
HEARTBURN: 0
PALPITATIONS: 0
COUGH: 0
WEAKNESS: 0
SHORTNESS OF BREATH: 0
JOINT SWELLING: 0
CHILLS: 0
CONSTIPATION: 0
ABDOMINAL PAIN: 0
MYALGIAS: 0

## 2022-09-27 ASSESSMENT — ASTHMA QUESTIONNAIRES
ACT_TOTALSCORE: 22
QUESTION_4 LAST FOUR WEEKS HOW OFTEN HAVE YOU USED YOUR RESCUE INHALER OR NEBULIZER MEDICATION (SUCH AS ALBUTEROL): ONCE A WEEK OR LESS
QUESTION_3 LAST FOUR WEEKS HOW OFTEN DID YOUR ASTHMA SYMPTOMS (WHEEZING, COUGHING, SHORTNESS OF BREATH, CHEST TIGHTNESS OR PAIN) WAKE YOU UP AT NIGHT OR EARLIER THAN USUAL IN THE MORNING: NOT AT ALL
QUESTION_2 LAST FOUR WEEKS HOW OFTEN HAVE YOU HAD SHORTNESS OF BREATH: ONCE OR TWICE A WEEK
QUESTION_5 LAST FOUR WEEKS HOW WOULD YOU RATE YOUR ASTHMA CONTROL: WELL CONTROLLED
QUESTION_1 LAST FOUR WEEKS HOW MUCH OF THE TIME DID YOUR ASTHMA KEEP YOU FROM GETTING AS MUCH DONE AT WORK, SCHOOL OR AT HOME: NONE OF THE TIME
ACT_TOTALSCORE: 22

## 2022-09-27 ASSESSMENT — ACTIVITIES OF DAILY LIVING (ADL): CURRENT_FUNCTION: NO ASSISTANCE NEEDED

## 2022-09-27 NOTE — PROGRESS NOTES
"  ASSESSMENT/PLAN:   Mona was seen today for imm/inj.    Diagnoses and all orders for this visit:    Encounter for Medicare annual wellness exam    High priority for 2019-nCoV vaccine  -     COVID-19,PF,PFIZER BOOSTER BIVALENT 12+Yrs    Immunization due  -     INFLUENZA, QUAD, HD, PF, 65+ (FLUZONE HD)  -     PNEUMOCOCCAL 20 VALENT CONJUGATE (PREVNAR 20)    Menopause  -     DEXA HIP/PELVIS/SPINE - Future; Future    Osteopenia of both hips  -     DEXA HIP/PELVIS/SPINE - Future; Future    Other orders  -     REVIEW OF HEALTH MAINTENANCE PROTOCOL ORDERS        COUNSELING:  Reviewed preventive health counseling, as reflected in patient instructions  Special attention given to:        Regular exercise       Skin care - lotion to feet    Estimated body mass index is 37.57 kg/m  as calculated from the following:    Height as of this encounter: 1.499 m (4' 11\").    Weight as of this encounter: 84.4 kg (186 lb).    Appropriate preventive services were discussed with this patient, including applicable screening as appropriate for cardiovascular disease, diabetes, osteopenia/osteoporosis, and glaucoma.  As appropriate for age/gender, discussed screening for colorectal cancer, prostate cancer, breast cancer, and cervical cancer. Checklist reviewing preventive services available has been given to the patient.    Reviewed patients plan of care and provided an AVS. The Basic Care Plan (routine screening as documented in Health Maintenance) for Mona meets the Care Plan requirement. This Care Plan has been established and reviewed with the Patient and MOHAN lo.    Identified Health Risks:    She is at risk for falling and has been provided with information to reduce the risk of falling at home.      She reports that she has never smoked. She has never used smokeless tobacco.    Anjali Navarro MD  [facility]    She is at risk for falling and has been provided with information to reduce the risk " "of falling at home.     SUBJECTIVE:   CC: Mona is an 71 year old who presents for preventive health visit.       Healthy Habits:     In general, how would you rate your overall health?  Good    Frequency of exercise:  6-7 days/week    Duration of exercise:  15-30 minutes    Do you usually eat at least 4 servings of fruit and vegetables a day, include whole grains    & fiber and avoid regularly eating high fat or \"junk\" foods?  Yes    Taking medications regularly:  Yes    Medication side effects:  None    Ability to successfully perform activities of daily living:  No assistance needed    Home Safety:  No safety concerns identified    Hearing Impairment:  No hearing concerns    In the past 6 months, have you been bothered by leaking of urine?  No    In general, how would you rate your overall mental or emotional health?  Good      PHQ-2 Total Score: 0    Additional concerns today:  No        Asthma    Asthma Control Test (Used with permission, [employer], 2011)     1. In the past 4 weeks, how much of the time did your asthma keep you from getting as much done at work, school or at home? -- None of the time1. In the past 4 weeks, how much of the time did your asthma keep you from getting as much done at work, school or at home?. None of the time. Proxy-Reported. Taken on 9/27/22 0947   2. During the past 4 weeks, how often have you had shortness of breath? -- Once or twice a week2. During the past 4 weeks, how often have you had shortness of breath?. Once or twice a week. Proxy-Reported. Taken on 9/27/22 0947   3. During the past 4 weeks, how often did your asthma symptoms (wheezing, coughing, shortness of breath, chest tightness or pain) wake you up at night or earlier than usual in the morning? -- Not at all3. During the past 4 weeks, how often did your asthma symptoms (wheezing, coughing, shortness of breath, chest tightness or pain) wake you up at night or earlier than usual in the morning?. Not at all. " Proxy-Reported. Taken on 9/27/22 0947   4. During the past 4 weeks, how often have you used your rescue inhaler or nebulizer medication (such as albuterol)? -- Once a week or less4. During the past 4 weeks, how often have you used your rescue inhaler or nebulizer medication (such as albuterol)?. Once a week or less. Proxy-Reported. Taken on 9/27/22 0947   5. How would you rate your asthma control during the past 4 weeks? -- Well controlled5. How would you rate your asthma control during the past 4 weeks?. Well controlled. Proxy-Reported. Taken on 9/27/22 0947   ACT TOTAL SCORE (Goal Greater than or Equal to 20) -- 22   In the past 12 months, how many times did you visit the emergency room for your asthma without being admitted to the hospital? -- NoneIn the past 12 months, how many times did you visit the emergency room for your asthma without being admitted to the hospital?. None. Proxy-Reported. Taken on 9/27/22 0947   In the past 12 months, how many times were you hospitalized overnight because of your asthma?           Wt Readings from Last 5 Encounters:   09/27/22 84.4 kg (186 lb)   03/08/22 81.6 kg (180 lb)   02/22/22 86.6 kg (191 lb)   01/18/22 86.2 kg (190 lb)   11/23/21 84.9 kg (187 lb 4 oz)     Hearing loss - Wears hearing aid which  are working      Fall Risk Assessment     Fallen 2 or more times in the past year? -- YesFallen 2 or more times in the past year?. Yes. Proxy-Reported. Taken on 9/27/22 0947   Any fall with injury in the past year? -- NoAny fall with injury in the past year?. No. Proxy-Reported. Taken on 9/27/22 0947        - Fell at the dentist office - tripped over a step   - Fell at [facility] in the [facility]  - twice - slippery floor   - Fell over a cord on the street   - Has not fallen at home   - no injuries    [employer] (not sure while this did not get entered). 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  Three words - got two out of three  - got banana and chair;    Clock draw -  "mixture of  numbers and times after the hour (I.e 3 pm was \"15\") but got the hands correctly        Today's PHQ-2 Score:   PHQ-2 ( 1999 [employer]) 9/27/2022   Q1: Little interest or pleasure in doing things 0   Q2: Feeling down, depressed or hopeless 0   PHQ-2 Score 0   PHQ-2 Total Score (12-17 Years)- Positive if 3 or more points; Administer PHQ-A if positive -   Q1: Little interest or pleasure in doing things Not at all   Q2: Feeling down, depressed or hopeless Not at all   PHQ-2 Score 0       Abuse: Current or Past (Physical, Sexual or Emotional) - No  Do you feel safe in your environment? Yes    discussed low salt    Social History     Tobacco Use     Smoking status: Never Smoker     Smokeless tobacco: Never Used   Substance Use Topics     Alcohol use: No       Alcohol Use 9/27/2022   Prescreen: >3 drinks/day or >7 drinks/week? No     Reviewed orders with patient.  Reviewed health maintenance and updated orders accordingly - Yes      Breast Cancer Screening:    Breast CA Risk Assessment (FHS-7) 9/27/2022   Do you have a family history of breast, colon, or ovarian cancer? No / Unknown         History of abnormal Pap smear: NO - age 67 - see link Cervical Cytology Screening Guidelines     Reviewed and updated as needed this visit by clinical staff   Tobacco  Allergies  Meds              sees eye doctor next month  Reviewed and updated as needed this visit by Provider                   Current providers sharing in care for this patient include:   Patient Care Team:  Anjali Navarro MD as PCP - Naidne Carrasquillo as Lead Care Coordinator (Primary Care - CC)  Anjali Navarro MD as Assigned PCP    The following health maintenance items are reviewed in Epic and correct as of today:  Health Maintenance   Topic Date Due     ASTHMA ACTION PLAN  09/22/2022     DEXA  10/20/2022     ASTHMA CONTROL TEST  03/27/2023     MAMMO SCREENING  08/02/2023     MEDICARE ANNUAL WELLNESS VISIT  09/27/2023     ANNUAL REVIEW OF HM " "ORDERS  09/27/2023     FALL RISK ASSESSMENT  09/27/2023     COLORECTAL CANCER SCREENING  10/31/2023     ADVANCE CARE PLANNING  09/22/2026     LIPID  01/18/2027     DTAP/TDAP/TD IMMUNIZATION (4 - Td or Tdap) 09/15/2030     HEPATITIS C SCREENING  Completed     PHQ-2 (once per calendar year)  Completed     INFLUENZA VACCINE  Completed     Pneumococcal Vaccine: 65+ Years  Completed     ZOSTER IMMUNIZATION  Completed     COVID-19 Vaccine  Completed     IPV IMMUNIZATION  Aged Out     MENINGITIS IMMUNIZATION  Aged Out     HEPATITIS B IMMUNIZATION  Aged Out       Review of Systems  Constitutional: negative for recent illness or change in weight  Eyes: negative for irritation and vision change  Respiratory: negative for cough; has  dyspnea on exertion and uses albuterol  Cardiovascular: negative for chest pain and palpitations  Gastrointestinal: negative for abdominal pain and change in bowel habits  Genitourinary:negative for dysuria, frequency and hesitancy  Integument/breast: negative for rash - previous intertrigo resolved  Hematologic/lymphatic: negative for bleeding and easy bruising  Musculoskeletal:negative for arthralgias and myalgias  Neurological: negative for dizziness, headaches and paresthesia       OBJECTIVE:   /70 (BP Location: Left arm, Patient Position: Sitting, Cuff Size: Adult Large)   Pulse 60   Ht 1.499 m (4' 11\")   Wt 84.4 kg (186 lb)   SpO2 97%   BMI 37.57 kg/m    Physical Exam  General appearance - alert, well appearing, and in no distress  Mental status - normal mood, behavior, speech, dress, motor activity, and thought processes  Eyes - pupils equal and reactive, extraocular eye movements intact, funduscopic exam normal, discs flat and sharp  Ears - bilateral TM's and external ear canals normal  Mouth - mucous membranes moist, pharynx normal without lesions  Neck - supple, no significant adenopathy, carotids upstroke normal bilaterally, no bruits, thyroid exam: thyroid is normal in size " without nodules or tenderness  Chest - clear to auscultation, no wheezes, rales or rhonchi, symmetric air entry  Heart - normal rate, regular rhythm, normal S1, S2, no murmurs, rubs, clicks or gallops  Abdomen - soft, nontender, nondistended, no masses or organomegaly  Breasts - breasts appear normal, no suspicious masses, no skin or nipple changes or axillary nodes  Neurological - alert, oriented, normal speech, no focal findings or movement disorder noted, DTR's normal and symmetric  Extremities - peripheral pulses normal, no pedal edema, no clubbing or cyanosis  Skin - no rashes or worrisome lesions. Dry skin of feet

## 2022-09-27 NOTE — PATIENT INSTRUCTIONS
Patient Education   Personalized Prevention Plan  You are due for the preventive services outlined below.  Your care team is available to assist you in scheduling these services.  If you have already completed any of these items, please share that information with your care team to update in your medical record.  Health Maintenance Due   Topic Date Due     Pneumococcal Vaccine (2 - PCV) 09/15/2021     COVID-19 Vaccine (4 - Booster for Pfizer series) 01/18/2022     Flu Vaccine (1) 09/01/2022     ANNUAL REVIEW OF HM ORDERS  09/22/2022     Asthma Action Plan - yearly  09/22/2022     Osteoporosis Screening  10/20/2022       Preventing Falls at Home  A person can fall for many reasons. Older adults may fall because reaction time slows down as we age. Your muscles and joints may get stiff, weak, or less flexible because of illness, medicines, or a physical condition.   Other health problems that make falls more likely include:     Arthritis    Dizziness or lightheadedness when you stand up (orthostatic hypotension)    History of a stroke    Dizziness    Anemia    Certain medicines taken for mental illness or to control blood pressure.    Problems with balance or gait    Bladder or urinary problems    History of falling    Changes in vision (vision impairment)    Changes in thinking skills and memory (cognitive impairment)  Injuries from a fall can include serious injuries such as broken bones, dislocated joints, internal bleeding and cuts. Injuries like these can limit your independence.   Prevention tips  To help prevent falls and fall-related injuries, follow the tips below.    Floors  To make floors safer:     Put nonskid pads under area rugs.    Remove small rugs.    Replace worn floor coverings.    Tack carpets firmly to each step on carpeted stairs. Put nonskid strips on the edges of uncarpeted stairs.    Keep floors and stairs free of clutter and cords.    Arrange furniture so there are clear pathways.    Clean  up any spills right away.  Bathrooms    To make bathrooms safer:     Install grab bars in the tub or shower.    Apply nonskid strips or put a nonskid rubber mat in the tub or shower.    Sit on a bath chair to bathe.    Use bathmats with nonskid backing.  Lighting  To improve visibility in your home:      Keep a flashlight in each room. Or put a lamp next to the bed within easy reach.    Put nightlights in the bedrooms, hallways, kitchen, and bathrooms.    Make sure all stairways have good lighting.    Take your time when going up and down stairs.    Put handrails on both sides of stairs and in walkways for more support. To prevent injury to your wrist or arm, don t use handrails to pull yourself up.    Install grab bars to pull yourself up.    Move or rearrange items that you use often. This will make them easier to find or reach.    Look at your home to find any safety hazards. Especially look at doorways, walkways, and the driveway. Remove or repair any safety problems that you find.  Other changes to make    Look around to find any safety hazards. Look closely at doorways, walkways, and the driveway. Remove or repair any safety problems that you find.    Wear shoes that fit well.    Take your time when going up and down stairs.    Put handrails on both sides of stairs and in walkways for more support. To prevent injury to your wrist or arm, don t use handrails to pull yourself up.    Install grab bars wherever needed to pull yourself up.    Arrange items that you use often. This will make them easier to find or reach.    [employer] last reviewed this educational content on 3/1/2020    5864-3073 [employer]. All rights reserved. This information is not intended as a substitute for professional medical care. Always follow your healthcare professional's instructions.           Patient Education   Personalized Prevention Plan  You are due for the preventive services outlined below.  Your care team is  available to assist you in scheduling these services.  If you have already completed any of these items, please share that information with your care team to update in your medical record.  Health Maintenance Due   Topic Date Due     Asthma Action Plan - yearly  09/22/2022     Osteoporosis Screening  10/20/2022       Preventing Falls at Home  A person can fall for many reasons. Older adults may fall because reaction time slows down as we age. Your muscles and joints may get stiff, weak, or less flexible because of illness, medicines, or a physical condition.   Other health problems that make falls more likely include:     Arthritis    Dizziness or lightheadedness when you stand up (orthostatic hypotension)    History of a stroke    Dizziness    Anemia    Certain medicines taken for mental illness or to control blood pressure.    Problems with balance or gait    Bladder or urinary problems    History of falling    Changes in vision (vision impairment)    Changes in thinking skills and memory (cognitive impairment)  Injuries from a fall can include serious injuries such as broken bones, dislocated joints, internal bleeding and cuts. Injuries like these can limit your independence.   Prevention tips  To help prevent falls and fall-related injuries, follow the tips below.    Floors  To make floors safer:     Put nonskid pads under area rugs.    Remove small rugs.    Replace worn floor coverings.    Tack carpets firmly to each step on carpeted stairs. Put nonskid strips on the edges of uncarpeted stairs.    Keep floors and stairs free of clutter and cords.    Arrange furniture so there are clear pathways.    Clean up any spills right away.  Bathrooms    To make bathrooms safer:     Install grab bars in the tub or shower.    Apply nonskid strips or put a nonskid rubber mat in the tub or shower.    Sit on a bath chair to bathe.    Use bathmats with nonskid backing.  Lighting  To improve visibility in your home:      Keep a  flashlight in each room. Or put a lamp next to the bed within easy reach.    Put nightlights in the bedrooms, hallways, kitchen, and bathrooms.    Make sure all stairways have good lighting.    Take your time when going up and down stairs.    Put handrails on both sides of stairs and in walkways for more support. To prevent injury to your wrist or arm, don t use handrails to pull yourself up.    Install grab bars to pull yourself up.    Move or rearrange items that you use often. This will make them easier to find or reach.    Look at your home to find any safety hazards. Especially look at doorways, walkways, and the driveway. Remove or repair any safety problems that you find.  Other changes to make    Look around to find any safety hazards. Look closely at doorways, walkways, and the driveway. Remove or repair any safety problems that you find.    Wear shoes that fit well.    Take your time when going up and down stairs.    Put handrails on both sides of stairs and in walkways for more support. To prevent injury to your wrist or arm, don t use handrails to pull yourself up.    Install grab bars wherever needed to pull yourself up.    Arrange items that you use often. This will make them easier to find or reach.    [email or web address] last reviewed this educational content on 3/1/2020    5305-5974 [employer]. All rights reserved. This information is not intended as a substitute for professional medical care. Always follow your healthcare professional's instructions.

## 2022-10-25 ENCOUNTER — PATIENT OUTREACH (OUTPATIENT)
Dept: GERIATRIC MEDICINE | Facility: CLINIC | Age: 72
End: 2022-10-25

## 2022-10-26 NOTE — PROGRESS NOTES
Encounter opened due to Regulatory [employer] [place] Update to open FVP Program.    Edmond John  Care Management Specialist  [facility]  [phone]

## 2022-10-26 NOTE — PROGRESS NOTES
Encounter opened due to Regulatory Comp[employer] [place]ara Update to close FVP Program.    Edmond John  Care Management Specialist  [facility]  [phone]

## 2022-11-02 ENCOUNTER — PATIENT OUTREACH (OUTPATIENT)
Dept: GERIATRIC MEDICINE | Facility: CLINIC | Age: 72
End: 2022-11-02

## 2022-11-02 NOTE — PROGRESS NOTES
[facility] Care Coordination Contact    Called ILS viktor Sarmiento to schedule annual HRA home visit. HRA has been scheduled for 11/8/22. Mona would prefer to do visit over the phone.     BALJINDER Gonsales, Manager   [facility]  [phone]

## 2022-11-29 ENCOUNTER — PATIENT OUTREACH (OUTPATIENT)
Dept: GERIATRIC MEDICINE | Facility: CLINIC | Age: 72
End: 2022-11-29

## 2022-11-29 ASSESSMENT — ACTIVITIES OF DAILY LIVING (ADL)
DEPENDENT_IADLS:: CLEANING;COOKING;LAUNDRY;SHOPPING;MEAL PREPARATION;MEDICATION MANAGEMENT;MONEY MANAGEMENT;TRANSPORTATION

## 2022-11-29 NOTE — Clinical Note
I am the [facility] care coordinator for Mona through her [facility] insurance. I completed her annual health risk assessment today. She continues to remain fairly active. Her needs are met well through services provided through her DD waiver.   BALJINDER Matson, Manager  [facility] [phone]

## 2022-11-29 NOTE — PROGRESS NOTES
[facility] Care Coordination Contact  [facility] CCDB Assessment   (for members on other waivers)    Home visit for Health Risk Assessment with Mona Barnett completed on November 29, 2022.  Visit was completed via phone due to member choice.     Type of residence:: Apartment  Current living arrangement:: I live in a private home     Assessment completed with:: Patient, Other    Current Care Plan  Member is a recipient of the DD Waiver program.  Member currently receiving the following home care services: None     Member currently receiving the following community resources: Lifeline, Meals on Wheels, Housekeeping/Chore Agency, Transportation Services (all services provided through DD waiver)    Medication Review  Medication reconciliation completed in Epic: Yes  Medication set-up & administration: Independent and sets up on own weekly.  Self-administers medications.  Medication Risk Assessment Medication (1 or more, place referral to MTM): N/A: No risk factors identified  MTM Referral Placed: No: No risk factors idenified    Mental/Behavioral Health   Depression Screening:   PHQ-2 Total Score (Adult) - Positive if 3 or more points; Administer PHQ-9 if positive: 0       Mental health DX:: No        Falls Assessment:   Fallen 2 or more times in the past year?: No   Any fall with injury in the past year?: No    ADL/IADL Dependencies:   Dependent ADLs:: Independent  Dependent IADLs:: Cleaning, Cooking, Laundry, Shopping, Meal Preparation, Medication Management, Money Management, Transportation    [facility] sponsored benefits: Shared information re: Silver Sneakers/gym memberships, ASA, Calcium +D.    PCA Assessment completed at visit: Not Applicable      Care Plan & Recommendations: Mona continues to have a positive attitude and feels her health is good. She receives support from her ILS worker with coordinating appointments and reminders to take her medication.   She is still working part time in  the [employer], but contemplates retiring this year.   Mona feels well supported with the services she receives and had no needs at this time.     DD Care Plan/ISP requested from waiver .     Follow-Up Plan: Member informed of future contact, plan to f/u with member with a 6 month telephone assessment.  Contact information shared with member and family, encouraged member to call with any questions or concerns at any time.    [facility] care continuum providers: Please see Snapshot and Care Management Flowsheets for Specific details of care plan.    This CC note routed to PCP.     BALJINDER Gonsales, Manager   [facility]  [phone]

## 2022-12-05 ENCOUNTER — PATIENT OUTREACH (OUTPATIENT)
Dept: GERIATRIC MEDICINE | Facility: CLINIC | Age: 72
End: 2022-12-05

## 2022-12-05 NOTE — PROGRESS NOTES
[facility] Care Coordination Contact    Received after visit chart from care coordinator.  Completed following tasks: Mailed copy of care plan to client.   Mailed [employer] Safe Medication Disposal .    CC mailed POC and POC signature page to member's guardian.     Edmond John  Care Management Specialist  [facility]  [phone]

## 2022-12-06 ENCOUNTER — OFFICE VISIT (OUTPATIENT)
Dept: FAMILY MEDICINE | Facility: CLINIC | Age: 72
End: 2022-12-06
Payer: MEDICARE

## 2022-12-06 VITALS
SYSTOLIC BLOOD PRESSURE: 137 MMHG | HEART RATE: 88 BPM | TEMPERATURE: 97.8 F | RESPIRATION RATE: 16 BRPM | OXYGEN SATURATION: 95 % | BODY MASS INDEX: 37.56 KG/M2 | DIASTOLIC BLOOD PRESSURE: 68 MMHG | WEIGHT: 186.3 LBS | HEIGHT: 59 IN

## 2022-12-06 DIAGNOSIS — J45.20: ICD-10-CM

## 2022-12-06 DIAGNOSIS — L82.1 SEBORRHEIC KERATOSES: Primary | ICD-10-CM

## 2022-12-06 PROCEDURE — 99214 OFFICE O/P EST MOD 30 MIN: CPT | Performed by: FAMILY MEDICINE

## 2022-12-06 RX ORDER — ALBUTEROL SULFATE 90 UG/1
AEROSOL, METERED RESPIRATORY (INHALATION)
Qty: 8.5 G | Refills: 5 | Status: SHIPPED | OUTPATIENT
Start: 2022-12-06 | End: 2023-11-07

## 2023-01-23 ENCOUNTER — ANCILLARY PROCEDURE (OUTPATIENT)
Dept: BONE DENSITY | Facility: CLINIC | Age: 73
End: 2023-01-23
Attending: FAMILY MEDICINE
Payer: MEDICARE

## 2023-01-23 DIAGNOSIS — Z78.0 MENOPAUSE: ICD-10-CM

## 2023-01-23 DIAGNOSIS — M85.852 OSTEOPENIA OF BOTH HIPS: ICD-10-CM

## 2023-01-23 DIAGNOSIS — M85.851 OSTEOPENIA OF BOTH HIPS: ICD-10-CM

## 2023-01-23 PROCEDURE — 77080 DXA BONE DENSITY AXIAL: CPT | Mod: TC | Performed by: RADIOLOGY

## 2023-02-13 ENCOUNTER — PATIENT OUTREACH (OUTPATIENT)
Dept: GERIATRIC MEDICINE | Facility: CLINIC | Age: 73
End: 2023-02-13
Payer: COMMERCIAL

## 2023-02-13 NOTE — PROGRESS NOTES
[facility] Care Coordination Contact    Texted Mona's ILS worker Guillermina to arrange call for Tuesday, 2/21  to discuss change from MSC+ to MSHO.     BALJINDER Gonsales, Manager   [facility]  [phone]

## 2023-02-13 NOTE — PROGRESS NOTES
[facility] Care Coordination Contact    Member changed health plan products from [facility] MSC+ to [facility] MSHO effective 2/1/23. CC to complete items on Product Change/ Health Plan Change check list including MMIS entry. [place] and other required tasks.    Edmond John  Care Management Specialist  [facility]  [phone]

## 2023-04-10 ENCOUNTER — TELEPHONE (OUTPATIENT)
Dept: FAMILY MEDICINE | Facility: CLINIC | Age: 73
End: 2023-04-10
Payer: COMMERCIAL

## 2023-04-10 DIAGNOSIS — Z97.4 DOES USE HEARING AID: Primary | ICD-10-CM

## 2023-04-10 NOTE — TELEPHONE ENCOUNTER
Spoke to patient who is requesting a hearing referral at [facility] in order to replace her lost left hearing aid.    I have placed the referral for your approval.

## 2023-04-10 NOTE — TELEPHONE ENCOUNTER
Order/Referral Request    Who is requesting: pt lost her right hearing aid     Orders being requested: Needing letter and order    Reason service is needed/diagnosis: hearing loss    When are orders needed by: asap    Has this been discussed with Provider: Yes    Does patient have a preference on a Group/Provider/Facility? [facility]    Does patient have an appointment scheduled?: No    Where to send orders: Fax    Okay to leave a detailed message?: Yes at Other phone number:  [phone]-Guillermina S worker

## 2023-04-11 NOTE — TELEPHONE ENCOUNTER
April 11, 2023    REFERRAL FAX TO [facility]  [phone]    [facility]

## 2023-04-27 ENCOUNTER — TRANSFERRED RECORDS (OUTPATIENT)
Dept: HEALTH INFORMATION MANAGEMENT | Facility: CLINIC | Age: 73
End: 2023-04-27
Payer: COMMERCIAL

## 2023-05-11 ENCOUNTER — TELEPHONE (OUTPATIENT)
Dept: FAMILY MEDICINE | Facility: CLINIC | Age: 73
End: 2023-05-11
Payer: COMMERCIAL

## 2023-05-11 NOTE — TELEPHONE ENCOUNTER
May 11, 2023    Per initial initial request, medication list and Problem List was sent via fax to [phone].     Ariel Boss III

## 2023-05-16 ENCOUNTER — PATIENT OUTREACH (OUTPATIENT)
Dept: GERIATRIC MEDICINE | Facility: CLINIC | Age: 73
End: 2023-05-16
Payer: COMMERCIAL

## 2023-05-16 ENCOUNTER — TELEPHONE (OUTPATIENT)
Dept: FAMILY MEDICINE | Facility: CLINIC | Age: 73
End: 2023-05-16
Payer: COMMERCIAL

## 2023-05-16 NOTE — PROGRESS NOTES
[facility] Care Coordination Contact      [facility] Six-Month Telephone Assessment    6 month telephone assessment completed on 5/10/23. CC was able to arrange call through Mona's ILS worker Guillermina.     ER visits: No  Hospitalizations: No  TCU stays: No  Significant health status changes: None   Falls/Injuries: No  ADL/IADL changes: No  Changes in services: No    Caregiver Assessment follow up:  N/A    Goals: See POC in chart for goal progress documentation.      Will see member in 6 months for an annual health risk assessment.   Encouraged member to call CC with any questions or concerns in the meantime.     BALJINDER Gonsales, Manager   [facility]  [phone]

## 2023-05-16 NOTE — TELEPHONE ENCOUNTER
May 16, 2023    [facility] was received via fax for Dr. Navarro to sign.  Patient label was attached to paperwork and updated medication list and ICD-10 Codes was printed and fax to [phone]    Astrid Raines

## 2023-06-13 ENCOUNTER — TELEPHONE (OUTPATIENT)
Dept: FAMILY MEDICINE | Facility: CLINIC | Age: 73
End: 2023-06-13
Payer: COMMERCIAL

## 2023-06-13 NOTE — TELEPHONE ENCOUNTER
June 13, 2023    [facility] was received via fax for Dr. Navarro to sign.  Patient label was attached to paperwork and placed in provider's inbox to be signed.    Denisha Robles

## 2023-06-15 NOTE — TELEPHONE ENCOUNTER
Marilyn 15, 2023    [facility] was picked up from outbox of Dr. Navarro.  Paperwork has been reviewed and is complete.  Per initial initial request, this was sent via fax to [phone].     Denisha Robles

## 2023-08-07 ENCOUNTER — ANCILLARY PROCEDURE (OUTPATIENT)
Dept: MAMMOGRAPHY | Facility: CLINIC | Age: 73
End: 2023-08-07
Attending: FAMILY MEDICINE
Payer: COMMERCIAL

## 2023-08-07 DIAGNOSIS — Z12.31 VISIT FOR SCREENING MAMMOGRAM: ICD-10-CM

## 2023-08-07 PROCEDURE — 77067 SCR MAMMO BI INCL CAD: CPT | Mod: TC | Performed by: STUDENT IN AN ORGANIZED HEALTH CARE EDUCATION/TRAINING PROGRAM

## 2023-08-28 ENCOUNTER — PATIENT OUTREACH (OUTPATIENT)
Dept: CARE COORDINATION | Facility: CLINIC | Age: 73
End: 2023-08-28
Payer: COMMERCIAL

## 2023-09-11 ENCOUNTER — PATIENT OUTREACH (OUTPATIENT)
Dept: CARE COORDINATION | Facility: CLINIC | Age: 73
End: 2023-09-11
Payer: COMMERCIAL

## 2023-10-10 ENCOUNTER — PATIENT OUTREACH (OUTPATIENT)
Dept: GERIATRIC MEDICINE | Facility: CLINIC | Age: 73
End: 2023-10-10
Payer: COMMERCIAL

## 2023-10-10 NOTE — PROGRESS NOTES
[facility] Care Coordination Contact    Called  MOHAN Sarmiento   to schedule annual HRA home visit. HRA has been scheduled for 10/23/23.    BALJINDER Gonsales, Manager   [facility]  [phone]

## 2023-10-23 ENCOUNTER — PATIENT OUTREACH (OUTPATIENT)
Dept: GERIATRIC MEDICINE | Facility: CLINIC | Age: 73
End: 2023-10-23
Payer: COMMERCIAL

## 2023-10-23 ASSESSMENT — ACTIVITIES OF DAILY LIVING (ADL): DEPENDENT_IADLS:: CLEANING;COOKING;LAUNDRY;SHOPPING;MONEY MANAGEMENT;MEDICATION MANAGEMENT;TRANSPORTATION

## 2023-10-23 NOTE — PROGRESS NOTES
[facility] Care Coordination Contact  [facility] CCDB Assessment   (for members on other waivers)    Home visit for Health Risk Assessment with Mona Barnett completed on October 23, 2023    Type of residence:: Apartment  Current living arrangement:: I live alone     Assessment completed with:: Patient, Care Team Member    Current Care Plan  Member is a recipient of the DD Waiver program.  Member currently receiving the following home care services:   N/A  Member currently receiving the following community resources: Lifeline, Meals on Wheels, ILS, Employment services   All services provided through DD waiver     Medication Review  Medication reconciliation completed in Epic: Yes  Medication set-up & administration: Independent and sets up on own weekly.  Self-administers medications.  Medication Risk Assessment Medication (1 or more, place referral to MTM): N/A: No risk factors identified  MTM Referral Placed: No: No risk factors idenified    Mental/Behavioral Health   Depression Screening:   PHQ-2 Total Score (Adult) - Positive if 3 or more points; Administer PHQ-9 if positive: 0       Mental health DX:: No        Falls Assessment:   Fallen 2 or more times in the past year?: No   Any fall with injury in the past year?: No    ADL/IADL Dependencies:   Dependent ADLs:: Independent  Dependent IADLs:: Cleaning, Cooking, Laundry, Shopping, Money Management, Medication Management, Transportation    [facility] benefits: Shared information re: Silver Sneakers/gym memberships, ASA, Calcium +D.    PCA Assessment completed at visit: Not Applicable      Care Plan & Recommendations: Mona continues to do well with her DD waiver services and feels they keep her safe in the community. She feels her health is stable and well managed. Her ILS worker Guillermina remains very involved and helps Mona with a lot. She has contemplated leaving recently due to other obligations, but will stay on at this time.      DD Care Plan/ISP requested from waiver .     Follow-Up Plan: Member informed of future contact, plan to f/u with member with a 6 month telephone assessment.  Contact information shared with member and family, encouraged member to call with any questions or concerns at any time.    [facility] care continuum providers: Please see Snapshot and Care Management Flowsheets for Specific details of care plan.    This CC note routed to PCP, Anjali Navarro.     BALJINDER Gonsales, Manager   [facility]  [phone]

## 2023-10-23 NOTE — Clinical Note
I am the [facility] care coordinator for Mona through her [facility] insurance. I saw her this week to complete her annual health risk assessment. She continues to do well with her DD waivered services.   Nadine Higgins [facility], Manager  [facility] [phone]

## 2023-11-03 ENCOUNTER — PATIENT OUTREACH (OUTPATIENT)
Dept: GERIATRIC MEDICINE | Facility: CLINIC | Age: 73
End: 2023-11-03
Payer: COMMERCIAL

## 2023-11-03 NOTE — PROGRESS NOTES
[facility] Care Coordination Contact    Received after visit chart from care coordinator.  Completed following tasks:  Mailed copy of care plan to client  Mailed Safe Medication Disposal  Mailed [facility]Care Leave Behind Letter  Mailed [place] signature sheet page 3-4    Edmond John  Care Management Specialist  [facility]  [phone]

## 2023-11-03 NOTE — LETTER
November 3, 2023      DEON ZIMMER  [address]   [place] [address]      Dear Deon:    At [facility], we re dedicated to improving your health and wellness. Enclosed is the Care Plan developed with you on 10/23/23. Please review the Care Plan carefully.    As a reminder, during your visit we talked about:  Ways to manage your physical and mental health  Using health care to maintain and improve your health   Your preventive care needs     Remember to contact your care coordinator if you:  Are hospitalized, or plan to be hospitalized   Have a fall    Have a change in your physical or mental health  Need help finding support or services    If you have questions, or don t agree with your Care Plan, call me at [phone]. You can also call me if your needs change. TTY users, call the [place] [employer] at (429) or 1-425.449.7363 (vcdlzb-ts-zvyjov relay service).    Sincerely,    BALJINDER Gonsales    E-mail: [email or web address]  Phone: [phone]      [facility]) is a health plan that contracts with both Medicare and the [place] Medical Assistance (Medicaid) program to provide benefits of both programs to enrollees. Enrollment in [facility] depends on contract renewal.    [id number] accepted    [id number] (07/2022)

## 2023-11-07 ENCOUNTER — OFFICE VISIT (OUTPATIENT)
Dept: FAMILY MEDICINE | Facility: CLINIC | Age: 73
End: 2023-11-07
Payer: COMMERCIAL

## 2023-11-07 ENCOUNTER — LAB (OUTPATIENT)
Dept: FAMILY MEDICINE | Facility: CLINIC | Age: 73
End: 2023-11-07

## 2023-11-07 VITALS
OXYGEN SATURATION: 96 % | TEMPERATURE: 97.3 F | BODY MASS INDEX: 38.47 KG/M2 | SYSTOLIC BLOOD PRESSURE: 118 MMHG | DIASTOLIC BLOOD PRESSURE: 72 MMHG | HEART RATE: 61 BPM | RESPIRATION RATE: 16 BRPM | WEIGHT: 190.8 LBS | HEIGHT: 59 IN

## 2023-11-07 DIAGNOSIS — Z12.11 SCREEN FOR COLON CANCER: ICD-10-CM

## 2023-11-07 DIAGNOSIS — Z00.00 ENCOUNTER FOR MEDICARE ANNUAL WELLNESS EXAM: Primary | ICD-10-CM

## 2023-11-07 DIAGNOSIS — J45.20: ICD-10-CM

## 2023-11-07 DIAGNOSIS — Z29.11 NEED FOR VACCINATION AGAINST RESPIRATORY SYNCYTIAL VIRUS: ICD-10-CM

## 2023-11-07 DIAGNOSIS — R73.03 PRE-DIABETES: ICD-10-CM

## 2023-11-07 DIAGNOSIS — Z29.11 NEED FOR RSV IMMUNIZATION: ICD-10-CM

## 2023-11-07 DIAGNOSIS — D64.9 LOW HEMOGLOBIN: ICD-10-CM

## 2023-11-07 LAB
ERYTHROCYTE [DISTWIDTH] IN BLOOD BY AUTOMATED COUNT: 16.5 % (ref 10–15)
HBA1C MFR BLD: 5.8 % (ref 0–5.6)
HCT VFR BLD AUTO: 40.7 % (ref 35–47)
HGB BLD-MCNC: 11.9 G/DL (ref 11.7–15.7)
MCH RBC QN AUTO: 24.3 PG (ref 26.5–33)
MCHC RBC AUTO-ENTMCNC: 29.2 G/DL (ref 31.5–36.5)
MCV RBC AUTO: 83 FL (ref 78–100)
PLATELET # BLD AUTO: 339 10E3/UL (ref 150–450)
RBC # BLD AUTO: 4.9 10E6/UL (ref 3.8–5.2)
WBC # BLD AUTO: 4.7 10E3/UL (ref 4–11)

## 2023-11-07 PROCEDURE — 90471 IMMUNIZATION ADMIN: CPT | Performed by: FAMILY MEDICINE

## 2023-11-07 PROCEDURE — 99213 OFFICE O/P EST LOW 20 MIN: CPT | Mod: 25 | Performed by: FAMILY MEDICINE

## 2023-11-07 PROCEDURE — 85027 COMPLETE CBC AUTOMATED: CPT | Performed by: FAMILY MEDICINE

## 2023-11-07 PROCEDURE — 90678 RSV VACC PREF BIVALENT IM: CPT | Performed by: FAMILY MEDICINE

## 2023-11-07 PROCEDURE — 83036 HEMOGLOBIN GLYCOSYLATED A1C: CPT | Performed by: FAMILY MEDICINE

## 2023-11-07 PROCEDURE — 90480 ADMN SARSCOV2 VAC 1/ONLY CMP: CPT | Performed by: FAMILY MEDICINE

## 2023-11-07 PROCEDURE — 91320 SARSCV2 VAC 30MCG TRS-SUC IM: CPT | Performed by: FAMILY MEDICINE

## 2023-11-07 PROCEDURE — 36415 COLL VENOUS BLD VENIPUNCTURE: CPT | Performed by: FAMILY MEDICINE

## 2023-11-07 PROCEDURE — G0439 PPPS, SUBSEQ VISIT: HCPCS | Performed by: FAMILY MEDICINE

## 2023-11-07 RX ORDER — RESPIRATORY SYNCYTIAL VIRUS VACCINE 120MCG/0.5
0.5 KIT INTRAMUSCULAR ONCE
Qty: 1 EACH | Refills: 0 | Status: SHIPPED | OUTPATIENT
Start: 2023-11-07 | End: 2023-11-07

## 2023-11-07 RX ORDER — ALBUTEROL SULFATE 90 UG/1
AEROSOL, METERED RESPIRATORY (INHALATION)
Qty: 8.5 G | Refills: 5 | Status: SHIPPED | OUTPATIENT
Start: 2023-11-07

## 2023-11-07 ASSESSMENT — ASTHMA QUESTIONNAIRES
QUESTION_2 LAST FOUR WEEKS HOW OFTEN HAVE YOU HAD SHORTNESS OF BREATH: THREE TO SIX TIMES A WEEK
ACT_TOTALSCORE: 20
QUESTION_4 LAST FOUR WEEKS HOW OFTEN HAVE YOU USED YOUR RESCUE INHALER OR NEBULIZER MEDICATION (SUCH AS ALBUTEROL): TWO OR THREE TIMES PER WEEK
QUESTION_1 LAST FOUR WEEKS HOW MUCH OF THE TIME DID YOUR ASTHMA KEEP YOU FROM GETTING AS MUCH DONE AT WORK, SCHOOL OR AT HOME: NONE OF THE TIME
ACT_TOTALSCORE: 20
ACUTE_EXACERBATION_TODAY: NO
QUESTION_5 LAST FOUR WEEKS HOW WOULD YOU RATE YOUR ASTHMA CONTROL: WELL CONTROLLED
QUESTION_3 LAST FOUR WEEKS HOW OFTEN DID YOUR ASTHMA SYMPTOMS (WHEEZING, COUGHING, SHORTNESS OF BREATH, CHEST TIGHTNESS OR PAIN) WAKE YOU UP AT NIGHT OR EARLIER THAN USUAL IN THE MORNING: NOT AT ALL

## 2023-11-07 ASSESSMENT — ENCOUNTER SYMPTOMS
JOINT SWELLING: 0
HEMATOCHEZIA: 0
FEVER: 0
DYSURIA: 0
BREAST MASS: 0
NERVOUS/ANXIOUS: 0
WEAKNESS: 0
HEMATURIA: 0
SHORTNESS OF BREATH: 0
FREQUENCY: 0
ABDOMINAL PAIN: 0
ARTHRALGIAS: 0
DIZZINESS: 0
DIARRHEA: 0
PARESTHESIAS: 0
MYALGIAS: 0
PALPITATIONS: 0
CHILLS: 0
COUGH: 0
HEADACHES: 0
NAUSEA: 0
EYE PAIN: 0
CONSTIPATION: 0
SORE THROAT: 0
HEARTBURN: 0

## 2023-11-07 ASSESSMENT — ACTIVITIES OF DAILY LIVING (ADL): CURRENT_FUNCTION: TRANSPORTATION REQUIRES ASSISTANCE

## 2023-11-07 NOTE — Clinical Note
Help! I cannot close this encounter - says vaccine pending but it looks like they have been administered.  SHI Hebert this is the overdue encounter

## 2023-11-07 NOTE — PROGRESS NOTES
"  ASSESSMENT / PLAN:   Mona was seen today for annual visit.    Diagnoses and all orders for this visit:    Encounter for Medicare annual wellness exam    Intermittent asthma with reliever use up to twice per week, uncomplicated  -     albuterol (PROAIR HFA/PROVENTIL HFA/VENTOLIN HFA) 108 (90 Base) MCG/ACT inhaler; [ALBUTEROL (PROAIR HFA) 90 MCG/ACTUATION INHALER] INHALE 2 PUFFS BY MOUTH EVERY 4 HOURS AS NEEDED    Low hemoglobin  repeat  -     CBC with platelets    Pre-diabetes  -     Hemoglobin A1c    Screen for colon cancer  -     WILVER([employer]); Future    Need for RSV immunization  -     Discontinue: respiratory syncytial virus vaccine, bivalent (ABRYSVO) injection; Inject 0.5 mLs into the muscle once for 1 dose    Other orders  -     COVID-19 12+ (2023-24) (PFIZER)  -     REVIEW OF HEALTH MAINTENANCE PROTOCOL ORDERS  -     PRIMARY CARE FOLLOW-UP SCHEDULING; Future        COUNSELING:  Reviewed preventive health counseling, as reflected in patient instructions      BMI:   Estimated body mass index is 38.21 kg/m  as calculated from the following:    Height as of this encounter: 1.505 m (4' 11.25\").    Weight as of this encounter: 86.5 kg (190 lb 12.8 oz).   Weight management plan:  she walks daily and weight is stable.     Wt Readings from Last 5 Encounters:   11/07/23 86.5 kg (190 lb 12.8 oz)   12/06/22 84.5 kg (186 lb 4.8 oz)   09/27/22 84.4 kg (186 lb)   03/08/22 81.6 kg (180 lb)   02/22/22 86.6 kg (191 lb)         She reports that she has never smoked. She has never used smokeless tobacco.      Appropriate preventive services were discussed with this patient, including applicable screening as appropriate for fall prevention, nutrition, physical activity, Tobacco-use cessation, weight loss and cognition.  Checklist reviewing preventive services available has been given to the patient.    Reviewed patients plan of care and provided an AVS. The Basic Care Plan (routine screening as documented in Health " "Maintenance) for Mona meets the Care Plan requirement. This Care Plan has been established and reviewed with the Patient and .        Anjali Navarro MD  [facility]    Identified Health Risks:  SUBJECTIVE:   Mona is a 73 year old who presents for Preventive Visit.      11/7/2023    10:23 AM   Additional Questions   Roomed by HUMPHREY Mccoy   Accompanied by Ind Living Staff       Are you in the first 12 months of your Medicare coverage?  No    Healthy Habits:     In general, how would you rate your overall health?  Good    Frequency of exercise:  2-3 days/week    Duration of exercise:  15-30 minutes    Do you usually eat at least 4 servings of fruit and vegetables a day, include whole grains    & fiber and avoid regularly eating high fat or \"junk\" foods?  Yes    Taking medications regularly:  Yes    Medication side effects:  None    Ability to successfully perform activities of daily living:  Transportation requires assistance    Home Safety:  No safety concerns identified    Hearing Impairment:  Difficulty following a conversation in a noisy restaurant or crowded room and need to ask people to speak up or repeat themselves    In the past 6 months, have you been bothered by leaking of urine?  No    In general, how would you rate your overall mental or emotional health?  Good    Additional concerns today:  No    Mona is here with her  Guillermina, who can provide some history where Mona has a harder time remembering were discussing.    Intermittent Asthma: Guillermina has noticed that Mona seems to get more short of breath with walking than she used to be.  I asked Mona about several scenarios -she says that she is short of breath when walking from the [employer] rail stop to her job at [email or web address] - but not every time -  and she is short of breath when she walks for a while.  She stops her to use her albuterol and inhaler in both cases.  She is not really clear " whether she is worse than she has been in the past. ACT score today is 19    Hearing loss: Guillermina says that Mona has lost one of her hearing aids for a second time and they are battling her insurance to try to get another hearing aid covered.. Seen at audiology yesterday -Guillermina had previously sent her audiologist prior authorization form but it turns out they never followed up  on this..  Mona may have lost her hearing aid when she was visiting her sister and they stopped her to drive through.  Guillermina has advised Mona never to take out her     Preventive   - Mona is due for colon cancer screening.  The issue with her colonoscopy is that she did not do an adequate prep last time and no one can stay with her to make sure this was being done.  I suggested we try a Cologuard this time, though of course if this is positive she would still need a colonoscopy.      Today's PHQ-2 Score:       11/7/2023    10:21 AM   PHQ-2 ( 1999 Pfizer)   Q1: Little interest or pleasure in doing things 0   Q2: Feeling down, depressed or hopeless 0   PHQ-2 Score 0   Q1: Little interest or pleasure in doing things Not at all   Q2: Feeling down, depressed or hopeless Not at all   PHQ-2 Score 0     Wt Readings from Last 5 Encounters:   11/07/23 86.5 kg (190 lb 12.8 oz)   12/06/22 84.5 kg (186 lb 4.8 oz)   09/27/22 84.4 kg (186 lb)   03/08/22 81.6 kg (180 lb)   02/22/22 86.6 kg (191 lb)     Have you ever done Advance Care Planning? (For example, a Health Directive, POLST, or a discussion with a medical provider or your loved ones about your wishes): No, advance care planning information given to patient to review.  Patient plans to discuss their wishes with loved ones or provider.         Fall risk  Fallen 2 or more times in the past year?: No  Any fall with injury in the past year?: No    Cognitive Screening   1) Repeat 3 items (Leader, Season, Table)    2) Clock draw: ABNORMAL    3) 3 item recall: Recalls 2 objects   Results:  ABNORMAL clock, 1-2 items recalled: PROBABLE COGNITIVE IMPAIRMENT, **INFORM PROVIDER**    Mona has known intellectual disabilities.  She is actually functioning quite well and is employed.  She also appears more youthful than her stated age.  Mini-CogTM Copyright NABOR Figueredo. Licensed by the author for use in [facility]; reprinted with permission (juan@[facility]). All rights reserved.          Reviewed and updated as needed this visit by clinical staff   Tobacco  Allergies  Meds              Reviewed and updated as needed this visit by Provider                 Social History     Tobacco Use    Smoking status: Never    Smokeless tobacco: Never   Substance Use Topics    Alcohol use: No             11/7/2023    10:20 AM   Alcohol Use   Prescreen: >3 drinks/day or >7 drinks/week? No     Do you have a current opioid prescription? No  Do you use any other controlled substances or medications that are not prescribed by a provider? None        Current providers sharing in care for this patient include:   Patient Care Team:  Anjali Navarro MD as PCP - Nadine Carrasquillo as Lead Care Coordinator (Primary Care - CC)  Anjali Navarro MD as Assigned PCP    The following health maintenance items are reviewed in Epic and correct as of today:  Health Maintenance   Topic Date Due    ASTHMA ACTION PLAN  09/22/2022    MEDICARE ANNUAL WELLNESS VISIT  09/27/2023    COLORECTAL CANCER SCREENING  10/31/2023    ASTHMA CONTROL TEST  05/07/2024    MAMMO SCREENING  08/07/2024    ANNUAL REVIEW OF HM ORDERS  11/07/2024    FALL RISK ASSESSMENT  11/07/2024    DEXA  01/23/2025    LIPID  01/18/2027    ADVANCE CARE PLANNING  11/07/2028    DTAP/TDAP/TD IMMUNIZATION (5 - Td or Tdap) 09/15/2030    HEPATITIS C SCREENING  Completed    PHQ-2 (once per calendar year)  Completed    INFLUENZA VACCINE  Completed    Pneumococcal Vaccine: 65+ Years  Completed    ZOSTER IMMUNIZATION  Completed    RSV VACCINE (Pregnancy & 60+)  Completed     "COVID-19 Vaccine  Completed    IPV IMMUNIZATION  Aged Out    HPV IMMUNIZATION  Aged Out    MENINGITIS IMMUNIZATION  Aged Out    RSV MONOCLONAL ANTIBODY  Aged Out     Review of Systems   Constitutional:  Negative for chills and fever.   HENT:  Positive for hearing loss. Negative for congestion, ear pain and sore throat.    Eyes:  Negative for pain and visual disturbance.   Respiratory:  Negative for cough and shortness of breath.    Cardiovascular:  Negative for chest pain, palpitations and peripheral edema.   Gastrointestinal:  Negative for abdominal pain, constipation, diarrhea, heartburn, hematochezia and nausea.   Breasts:  Negative for tenderness, breast mass and discharge.   Genitourinary:  Negative for dysuria, frequency, genital sores, hematuria, pelvic pain, urgency, vaginal bleeding and vaginal discharge.   Musculoskeletal:  Negative for arthralgias, joint swelling and myalgias.   Skin:  Negative for rash.   Neurological:  Negative for dizziness, weakness, headaches and paresthesias.   Psychiatric/Behavioral:  Negative for mood changes. The patient is not nervous/anxious.          OBJECTIVE:   /72 (BP Location: Left arm, Patient Position: Sitting, Cuff Size: Adult Regular)   Pulse 61   Temp 97.3  F (36.3  C) (Tympanic)   Resp 16   Ht 1.505 m (4' 11.25\")   Wt 86.5 kg (190 lb 12.8 oz)   SpO2 96%   BMI 38.21 kg/m   Estimated body mass index is 38.21 kg/m  as calculated from the following:    Height as of this encounter: 1.505 m (4' 11.25\").    Weight as of this encounter: 86.5 kg (190 lb 12.8 oz).  Physical Exam  General appearance - alert, well appearing, and in no distress; appears younger than stated age  Mental status - normal mood, behavior, speech, dress, motor activity, and thought processes -though she does have intellectual disabilities and has a hard time sometimes describing or remembering exact medical narrative  Eyes - pupils equal and reactive, extraocular eye movements intact, " funduscopic exam normal, discs flat and sharp  Ears - bilateral TM's and external ear canals normal  Mouth - mucous membranes moist, pharynx normal without lesions  Neck - supple, no significant adenopathy, carotids upstroke normal bilaterally, no bruits, thyroid exam: thyroid is normal in size without nodules or tenderness  Chest - clear to auscultation, no wheezes, rales or rhonchi, symmetric air entry  Heart - normal rate, regular rhythm, normal S1, S2, no murmurs, rubs, clicks or gallops  Abdomen - soft, nontender, nondistended, no masses or organomegaly  Breasts - breasts appear normal, no suspicious masses, no skin or nipple changes or axillary nodes  Neurological - alert, oriented, normal speech, no focal findings or movement disorder noted, DTR's normal and symmetric  Extremities - peripheral pulses normal, no pedal edema, no clubbing or cyanosis  Skin - no rashes or worrisome lesions    I have reviewed Opioid Use Disorder and Substance Use Disorder risk factors and made any needed referrals.   The patient reports that she has difficulty with activities of daily living. She has an ILS worker and community support  The patient was provided with written information regarding signs of hearing loss.

## 2023-11-08 NOTE — PATIENT INSTRUCTIONS
Patient Education   Personalized Prevention Plan  You are due for the preventive services outlined below.  Your care team is available to assist you in scheduling these services.  If you have already completed any of these items, please share that information with your care team to update in your medical record.  Health Maintenance Due   Topic Date Due     Asthma Action Plan - yearly  09/22/2022     Annual Wellness Visit  09/27/2023     Colorectal Cancer Screening  10/31/2023     Activities of Daily Living    Your Health Risk Assessment indicates you have difficulties with activities of daily living such as housework, bathing, preparing meals, taking medication, etc. Please make a follow up appointment for us to address this issue in more detail.  Hearing Loss: Care Instructions  Overview     Hearing loss is a sudden or slow decrease in how well you hear. It can range from slight to profound. Permanent hearing loss can occur with aging. It also can happen when you are exposed long-term to loud noise. Examples include listening to loud music, riding motorcycles, or being around other loud machines.  Hearing loss can affect your work and home life. It can make you feel lonely or depressed. You may feel that you have lost your independence. But hearing aids and other devices can help you hear better and feel connected to others.  Follow-up care is a key part of your treatment and safety. Be sure to make and go to all appointments, and call your doctor if you are having problems. It's also a good idea to know your test results and keep a list of the medicines you take.  How can you care for yourself at home?  Avoid loud noises whenever possible. This helps keep your hearing from getting worse.  Always wear hearing protection around loud noises.  Wear a hearing aid as directed.  A professional can help you pick a hearing aid that will work best for you.  You can also get hearing aids over the counter for mild to moderate  "hearing loss.  Have hearing tests as your doctor suggests. They can show whether your hearing has changed. Your hearing aid may need to be adjusted.  Use other devices as needed. These may include:  Telephone amplifiers and hearing aids that can connect to a television, stereo, radio, or microphone.  Devices that use lights or vibrations. These alert you to the doorbell, a ringing telephone, or a baby monitor.  Television closed-captioning. This shows the words at the bottom of the screen. Most new TVs can do this.  TTY (text telephone). This lets you type messages back and forth on the telephone instead of talking or listening. These devices are also called TDD. When messages are typed on the keyboard, they are sent over the phone line to a receiving TTY. The message is shown on a monitor.  Use text messaging, social media, and email if it is hard for you to communicate by telephone.  Try to learn a listening technique called speechreading. It is not lipreading. You pay attention to people's gestures, expressions, posture, and tone of voice. These clues can help you understand what a person is saying. Face the person you are talking to, and have them face you. Make sure the lighting is good. You need to see the other person's face clearly.  Think about counseling if you need help to adjust to your hearing loss.  When should you call for help?  Watch closely for changes in your health, and be sure to contact your doctor if:    You think your hearing is getting worse.     You have new symptoms, such as dizziness or nausea.   Where can you learn more?  Go to https://www.Re[employer].net/patiented  Enter R798 in the search box to learn more about \"Hearing Loss: Care Instructions.\"  Current as of: February 28, 2023               Content Version: 13.8    0888-0221 [employer].   Care instructions adapted under license by your healthcare professional. If you have questions about a medical condition or this " instruction, always ask your healthcare professional. [employer] disclaims any warranty or liability for your use of this information.

## 2023-12-19 ENCOUNTER — OFFICE VISIT (OUTPATIENT)
Dept: FAMILY MEDICINE | Facility: CLINIC | Age: 73
End: 2023-12-19
Payer: COMMERCIAL

## 2023-12-19 VITALS
TEMPERATURE: 97.8 F | WEIGHT: 187 LBS | HEART RATE: 63 BPM | RESPIRATION RATE: 18 BRPM | DIASTOLIC BLOOD PRESSURE: 93 MMHG | SYSTOLIC BLOOD PRESSURE: 159 MMHG | OXYGEN SATURATION: 97 % | BODY MASS INDEX: 37.45 KG/M2

## 2023-12-19 DIAGNOSIS — L98.9 SKIN SORE: Primary | ICD-10-CM

## 2023-12-19 PROCEDURE — 99213 OFFICE O/P EST LOW 20 MIN: CPT | Performed by: FAMILY MEDICINE

## 2023-12-19 RX ORDER — SULFAMETHOXAZOLE/TRIMETHOPRIM 800-160 MG
1 TABLET ORAL 2 TIMES DAILY
Qty: 14 TABLET | Refills: 0 | Status: SHIPPED | OUTPATIENT
Start: 2023-12-19 | End: 2023-12-26

## 2023-12-19 NOTE — PROGRESS NOTES
Assessment:     Skin sore  - sulfamethoxazole-trimethoprim (BACTRIM DS) 800-160 MG tablet  Dispense: 14 tablet; Refill: 0     Plan:     History of hidradenitis suppurativa today now with a draining sore underneath her right breast for the past month and a half.  Not currently draining and not amenable to getting a culture.  Given the surrounding erythema will treat with a course of Bactrim to cover MRSA.  Follow-up if symptoms getting worse or not improving in 1 week.    MEDICATIONS:   Orders Placed This Encounter   Medications    sulfamethoxazole-trimethoprim (BACTRIM DS) 800-160 MG tablet     Sig: Take 1 tablet by mouth 2 times daily for 7 days     Dispense:  14 tablet     Refill:  0       Subjective:       73 year old female presents with a  for evaluation of a draining sore underneath her right breast for the past month and a half.  It has been mildly tender and red surrounding the drainage.  No fevers.  She has a history of hidradenitis suppurativa.  No known history of RSA.    Patient Active Problem List   Diagnosis    Cholelithiasis    Iron Deficiency    Intellectual Disabilities    Hearing Loss    Hidradenitis Suppurativa    Urinary incontinence    Morbid obesity (H)    Intermittent asthma with reliever use up to twice per week, uncomplicated    Anemia, unspecified       Past Medical History:   Diagnosis Date    Cholelithiasis     Hearing loss     Hidradenitis suppurativa     Iron deficiency     Limb pain     Limb Pain     Created by Conversion     Mild intellectual disabilities (CODE)        Past Surgical History:   Procedure Laterality Date    BREAST CYST EXCISION Right 2008    Had cyst removed many years ago    [facility] REMOVAL GALLBLADDER      Description: Cholecystectomy;  Recorded: 04/15/2009;    [facility] TOTAL ABDOM HYSTERECTOMY      Description: Hysterectomy;  Recorded: 04/15/2009;       Current Outpatient Medications   Medication    albuterol (PROAIR HFA/PROVENTIL HFA/VENTOLIN HFA) 108 (90 Base)  MCG/ACT inhaler    cholecalciferol 25 MCG (1000 UT) TABS    ferrous sulfate (FEROSUL) 325 (65 Fe) MG tablet    multivitamin w/minerals (THERA-VIT-M) tablet    polyethylene glycol-propylene glycol (SYSTANE ULTRA) 0.4-0.3 % SOLN ophthalmic solution    sulfamethoxazole-trimethoprim (BACTRIM DS) 800-160 MG tablet     No current facility-administered medications for this visit.       No Known Allergies    Family History   Problem Relation Age of Onset    Breast Cancer No family hx of        Social History     Socioeconomic History    Marital status: Single     Spouse name: None    Number of children: None    Years of education: None    Highest education level: None   Tobacco Use    Smoking status: Never    Smokeless tobacco: Never   Substance and Sexual Activity    Alcohol use: No    Drug use: No    Sexual activity: Never     Social Determinants of Health     Financial Resource Strain: Low Risk  (11/7/2023)    Financial Resource Strain     Within the past 12 months, have you or your family members you live with been unable to get utilities (heat, electricity) when it was really needed?: No   Food Insecurity: Low Risk  (11/7/2023)    Food Insecurity     Within the past 12 months, did you worry that your food would run out before you got money to buy more?: No     Within the past 12 months, did the food you bought just not last and you didn t have money to get more?: No   Transportation Needs: Low Risk  (11/7/2023)    Transportation Needs     Within the past 12 months, has lack of transportation kept you from medical appointments, getting your medicines, non-medical meetings or appointments, work, or from getting things that you need?: No   Housing Stability: Low Risk  (11/7/2023)    Housing Stability     Do you have housing? : Yes     Are you worried about losing your housing?: No         Review of Systems  Pertinent items are noted in HPI.      Objective:     BP (!) 159/93 (BP Location: Right arm, Patient Position:  Sitting, Cuff Size: Adult Regular)   Pulse 63   Temp 97.8  F (36.6  C) (Oral)   Resp 18   Wt 84.8 kg (187 lb)   SpO2 97%   BMI 37.45 kg/m       General appearance: alert, appears stated age, and cooperative  Skin: Patient has a 1 cm open wound underneath the right breast with no obvious drainage currently.  There is some mild surrounding induration and erythema and tenderness noted.      This note has been dictated using voice recognition software. Any grammatical or context distortions are unintentional and inherent to the software

## 2023-12-27 LAB — NONINV COLON CA DNA+OCC BLD SCRN STL QL: NORMAL

## 2024-02-16 LAB — NONINV COLON CA DNA+OCC BLD SCRN STL QL: NORMAL

## 2024-04-22 ENCOUNTER — PATIENT OUTREACH (OUTPATIENT)
Dept: GERIATRIC MEDICINE | Facility: CLINIC | Age: 74
End: 2024-04-22
Payer: COMMERCIAL

## 2024-04-22 NOTE — PROGRESS NOTES
[facility] Care Coordination Contact      [facility] Six-Month Telephone Assessment    6 month telephone assessment completed on 4/22/24.    ER visits: No  Hospitalizations: No  TCU stays: No  Significant health status changes: None  Falls/Injuries: No  ADL/IADL changes: No  Changes in services: No    Caregiver Assessment follow up:  N/A    Goals: See POC in chart for goal progress documentation.     Mona continues to do well and reported things have been stable. Her ILS worker Guillermina reported the only current concern is getting the cologuard completed. The clinic states they've sent it out twice to Mona, but she hasn't received it.   Guillermina will follow up at Mona's next appt.     Will see member in 6 months for an annual health risk assessment.   Encouraged member to call CC with any questions or concerns in the meantime.

## 2024-07-09 ENCOUNTER — PATIENT OUTREACH (OUTPATIENT)
Dept: CARE COORDINATION | Facility: CLINIC | Age: 74
End: 2024-07-09
Payer: COMMERCIAL

## 2024-08-06 ENCOUNTER — PATIENT OUTREACH (OUTPATIENT)
Dept: CARE COORDINATION | Facility: CLINIC | Age: 74
End: 2024-08-06
Payer: COMMERCIAL

## 2024-08-08 ENCOUNTER — TRANSFERRED RECORDS (OUTPATIENT)
Dept: HEALTH INFORMATION MANAGEMENT | Facility: CLINIC | Age: 74
End: 2024-08-08
Payer: COMMERCIAL

## 2024-08-20 ENCOUNTER — ANCILLARY PROCEDURE (OUTPATIENT)
Dept: MAMMOGRAPHY | Facility: CLINIC | Age: 74
End: 2024-08-20
Attending: FAMILY MEDICINE
Payer: COMMERCIAL

## 2024-08-20 DIAGNOSIS — Z12.31 VISIT FOR SCREENING MAMMOGRAM: ICD-10-CM

## 2024-08-20 PROCEDURE — 77067 SCR MAMMO BI INCL CAD: CPT | Mod: TC | Performed by: RADIOLOGY

## 2024-08-20 PROCEDURE — 77063 BREAST TOMOSYNTHESIS BI: CPT | Mod: TC | Performed by: RADIOLOGY

## 2024-10-08 ENCOUNTER — PATIENT OUTREACH (OUTPATIENT)
Dept: CARE COORDINATION | Facility: CLINIC | Age: 74
End: 2024-10-08
Payer: COMMERCIAL

## 2024-10-21 ENCOUNTER — PATIENT OUTREACH (OUTPATIENT)
Dept: GERIATRIC MEDICINE | Facility: CLINIC | Age: 74
End: 2024-10-21
Payer: COMMERCIAL

## 2024-10-21 NOTE — PROGRESS NOTES
[facility] Care Coordination Contact    TC from [facility]. They were able to meet today or tomorrow. We opted for tomorrow morning.     BALJINDER Gonsales, Manager   [facility]  [phone]

## 2024-10-22 ENCOUNTER — PATIENT OUTREACH (OUTPATIENT)
Dept: GERIATRIC MEDICINE | Facility: CLINIC | Age: 74
End: 2024-10-22
Payer: COMMERCIAL

## 2024-10-22 ENCOUNTER — PATIENT OUTREACH (OUTPATIENT)
Dept: CARE COORDINATION | Facility: CLINIC | Age: 74
End: 2024-10-22
Payer: COMMERCIAL

## 2024-10-22 ASSESSMENT — ACTIVITIES OF DAILY LIVING (ADL): DEPENDENT_IADLS:: CLEANING;COOKING;LAUNDRY;SHOPPING;MONEY MANAGEMENT;MEDICATION MANAGEMENT;TRANSPORTATION

## 2024-10-22 NOTE — PROGRESS NOTES
[facility] Care Coordination Contact  [facility] CCDB Assessment   (for members on other waivers)    Home visit for Health Risk Assessment with Mona EVANS Ericka completed on October 22, 2024    Type of residence:: Apartment  Current living arrangement:: I live alone     Assessment completed with:: Patient, Care Team Member, Other (ILS worker Guillermina)    Current Care Plan  Member is a recipient of the DD Waiver program.  Member currently receiving the following home care services:   N/A  Member currently receiving the following community resources: [employer], [employer] services, employment services and transportation all through DD waiver.      Medication Review  Medication reconciliation completed in Epic: Yes  Medication set-up & administration: Independent-does not set up.  Self-administers medications.  Medication Risk Assessment Medication (1 or more, place referral to MTM): N/A: No risk factors identified  MTM Referral Placed: No: No risk factors idenified    Mental/Behavioral Health   Depression Screening:   PHQ-2 Total Score (Adult) - Positive if 3 or more points; Administer PHQ-9 if positive: 0       Mental health DX:: No        Falls Assessment:   Fallen 2 or more times in the past year?: No   Any fall with injury in the past year?: No    ADL/IADL Dependencies:   Dependent ADLs:: Independent  Dependent IADLs:: Cleaning, Cooking, Laundry, Shopping, Money Management, Medication Management, Transportation    Health Plan sponsored benefits: [facility]: Shared information regarding [employer]. Reviewed preventative health screening and health plan supplemental benefits/incentives. Reviewed medication disposal form.    PCA Assessment completed at visit: Not Applicable      Care Plan & Recommendations:     DD support plan [place] in [employer].     [employer] message sent to DD  with notification of HRA being complete and HRA support plan in [employer] for their review.    Follow-Up  Plan: Member informed of future contact, plan to f/u with member with a 6 month telephone assessment.  Contact information shared with member and family, encouraged member to call with any questions or concerns at any time.    [place] care continuum providers: Please see Snapshot and Care Management Flowsheets for Specific details of care plan.    This CC note routed to PCP, Anjali Navarro.     Nadine Higgins [facility], Manager   [facility]  [phone]

## 2024-10-22 NOTE — Clinical Note
I  am the [facility] care coordinator for Mona through her [facility] insurance. I was out to see her today for her annual health risk assessment. She appears to be stable with no current concerns. She will continue to receive all services through her DD waiver.   Nadine Higgins [facility], Manager  [facility] [phone]

## 2024-10-29 ENCOUNTER — PATIENT OUTREACH (OUTPATIENT)
Dept: GERIATRIC MEDICINE | Facility: CLINIC | Age: 74
End: 2024-10-29
Payer: COMMERCIAL

## 2024-10-29 NOTE — LETTER
October 29, 2024       DEON ZIMMER  [address]   [place] [address]      Dear Deon,    At [facility], we re dedicated to improving your health and wellness. Enclosed is the Support Plan developed with you on 10/22/24. Please review the Support Plan carefully.    As a reminder, during your visit we talked about:   Ways to manage your physical and mental health   Using health care to maintain and improve your health    Your preventive care needs      Remember to contact your care coordinator if you:   Are hospitalized or plan to be hospitalized    Have a fall     Have a change in your physical or mental health   Need help finding support or services    If you have questions or don t agree with your Support Plan, call me at [phone]. You can also call me if your needs change. TTY users call the [place] Relay at 558 or 1-651.134.5954 (ucyibg-mu-klroji relay service).    Sincerely,       BALJINDER Gonsales    E-mail: [email or web address]  Phone: [phone]      [facility]                [id number] accepted     (06/2024)                [address], [place] [address]  [phone]  fax [phone]  [facility] [place].[facility]

## 2024-10-29 NOTE — PROGRESS NOTES
[facility] Care Coordination Contact    Received after visit chart from care coordinator.  Completed following tasks: Mailed copy of support plan to member, Mailed [place] Choices signature sheet pages 3-4, and Mailed Safe Medication Disposal .    Edmond John  Care Management Specialist  [facility]  [phone]

## 2024-12-17 ENCOUNTER — OFFICE VISIT (OUTPATIENT)
Dept: FAMILY MEDICINE | Facility: CLINIC | Age: 74
End: 2024-12-17
Payer: COMMERCIAL

## 2024-12-17 VITALS
HEART RATE: 61 BPM | TEMPERATURE: 97.1 F | HEIGHT: 60 IN | SYSTOLIC BLOOD PRESSURE: 128 MMHG | OXYGEN SATURATION: 97 % | DIASTOLIC BLOOD PRESSURE: 72 MMHG | RESPIRATION RATE: 15 BRPM | BODY MASS INDEX: 36.32 KG/M2 | WEIGHT: 185 LBS

## 2024-12-17 DIAGNOSIS — D50.9 IRON DEFICIENCY ANEMIA, UNSPECIFIED IRON DEFICIENCY ANEMIA TYPE: ICD-10-CM

## 2024-12-17 DIAGNOSIS — Z00.00 ENCOUNTER FOR MEDICARE ANNUAL WELLNESS EXAM: Primary | ICD-10-CM

## 2024-12-17 DIAGNOSIS — Z78.0 MENOPAUSE: ICD-10-CM

## 2024-12-17 DIAGNOSIS — J45.20: ICD-10-CM

## 2024-12-17 DIAGNOSIS — R73.03 PRE-DIABETES: ICD-10-CM

## 2024-12-17 DIAGNOSIS — M85.89 OSTEOPENIA OF MULTIPLE SITES: ICD-10-CM

## 2024-12-17 DIAGNOSIS — H61.23 EXCESSIVE CERUMEN IN BOTH EAR CANALS: ICD-10-CM

## 2024-12-17 LAB
ERYTHROCYTE [DISTWIDTH] IN BLOOD BY AUTOMATED COUNT: 15.7 % (ref 10–15)
EST. AVERAGE GLUCOSE BLD GHB EST-MCNC: 123 MG/DL
HBA1C MFR BLD: 5.9 % (ref 0–5.6)
HCT VFR BLD AUTO: 41.8 % (ref 35–47)
HGB BLD-MCNC: 12.2 G/DL (ref 11.7–15.7)
MCH RBC QN AUTO: 23.8 PG (ref 26.5–33)
MCHC RBC AUTO-ENTMCNC: 29.2 G/DL (ref 31.5–36.5)
MCV RBC AUTO: 82 FL (ref 78–100)
PLATELET # BLD AUTO: 363 10E3/UL (ref 150–450)
RBC # BLD AUTO: 5.13 10E6/UL (ref 3.8–5.2)
WBC # BLD AUTO: 3.9 10E3/UL (ref 4–11)

## 2024-12-17 PROCEDURE — 36415 COLL VENOUS BLD VENIPUNCTURE: CPT | Performed by: FAMILY MEDICINE

## 2024-12-17 PROCEDURE — 85027 COMPLETE CBC AUTOMATED: CPT | Performed by: FAMILY MEDICINE

## 2024-12-17 PROCEDURE — 83036 HEMOGLOBIN GLYCOSYLATED A1C: CPT | Performed by: FAMILY MEDICINE

## 2024-12-17 RX ORDER — ALBUTEROL SULFATE 90 UG/1
INHALANT RESPIRATORY (INHALATION)
Qty: 8.5 G | Refills: 5 | Status: SHIPPED | OUTPATIENT
Start: 2024-12-17

## 2024-12-17 SDOH — HEALTH STABILITY: PHYSICAL HEALTH: ON AVERAGE, HOW MANY DAYS PER WEEK DO YOU ENGAGE IN MODERATE TO STRENUOUS EXERCISE (LIKE A BRISK WALK)?: 2 DAYS

## 2024-12-17 ASSESSMENT — ASTHMA QUESTIONNAIRES
QUESTION_3 LAST FOUR WEEKS HOW OFTEN DID YOUR ASTHMA SYMPTOMS (WHEEZING, COUGHING, SHORTNESS OF BREATH, CHEST TIGHTNESS OR PAIN) WAKE YOU UP AT NIGHT OR EARLIER THAN USUAL IN THE MORNING: NOT AT ALL
ACT_TOTALSCORE: 23
QUESTION_2 LAST FOUR WEEKS HOW OFTEN HAVE YOU HAD SHORTNESS OF BREATH: NOT AT ALL
ACT_TOTALSCORE: 23
QUESTION_1 LAST FOUR WEEKS HOW MUCH OF THE TIME DID YOUR ASTHMA KEEP YOU FROM GETTING AS MUCH DONE AT WORK, SCHOOL OR AT HOME: NONE OF THE TIME
QUESTION_4 LAST FOUR WEEKS HOW OFTEN HAVE YOU USED YOUR RESCUE INHALER OR NEBULIZER MEDICATION (SUCH AS ALBUTEROL): ONCE A WEEK OR LESS
QUESTION_5 LAST FOUR WEEKS HOW WOULD YOU RATE YOUR ASTHMA CONTROL: WELL CONTROLLED

## 2024-12-17 ASSESSMENT — SOCIAL DETERMINANTS OF HEALTH (SDOH): HOW OFTEN DO YOU GET TOGETHER WITH FRIENDS OR RELATIVES?: PATIENT DECLINED

## 2024-12-17 NOTE — LETTER
December 19, 2024      Mona Barnett  [address]   [place] [address]        Dear  Mona,    I am writing about you test results.    Your A1c -a test of average blood sugar -is a little elevated but generally in the same range as the previous 3 years.  This means you might be at increased risk of developing diabetes in the future, and given how stable this is I think we just continue to check this yearly.    Your blood count is mostly normal.  The amount of hemoglobin and your red blood cells (MCHC) is a tad low but similar to previous years.  Your overall red blood cell iron -hemoglobin level -is normal.    1 thing that is a little different: Your white blood cell count is a bit low for the first time.  This is very likely just a lab  error.  As a precaution, I would like to have you come in again in 1 to 3 months to check this, to make sure the level is not decreasing further.    If you have any questions or concerns, please call the clinic at the number listed above.       Sincerely,      Anjali Navarro MD    Resulted Orders   CBC with platelets   Result Value Ref Range    WBC Count 3.9 (L) 4.0 - 11.0 10e3/uL    RBC Count 5.13 3.80 - 5.20 10e6/uL    Hemoglobin 12.2 11.7 - 15.7 g/dL    Hematocrit 41.8 35.0 - 47.0 %    MCV 82 78 - 100 fL    MCH 23.8 (L) 26.5 - 33.0 pg    MCHC 29.2 (L) 31.5 - 36.5 g/dL    RDW 15.7 (H) 10.0 - 15.0 %    Platelet Count 363 150 - 450 10e3/uL   Hemoglobin A1c   Result Value Ref Range    Estimated Average Glucose 123 (H) <117 mg/dL    Hemoglobin A1C 5.9 (H) 0.0 - 5.6 %      Comment:      Normal <5.7%   Prediabetes 5.7-6.4%    Diabetes 6.5% or higher     Note: Adopted from ADA consensus guidelines.

## 2024-12-17 NOTE — LETTER
My Asthma Action Plan    Name: Mona Barnett   YOB: 1950  Date: 12/17/2024   My doctor: Anjali Navarro MD   My clinic: [facility]        My Rescue Medicine:   Albuterol inhaler (Proair/Ventolin/Proventil HFA)  2-4 puffs EVERY 4 HOURS as needed. Use a spacer if recommended by your provider.   My Asthma Severity:   Intermittent / Exercise Induced  Know your asthma triggers: exercise or sports             GREEN ZONE   Good Control  I feel good  No cough or wheeze  Can work, sleep and play without asthma symptoms       Take your asthma control medicine every day.     If exercise triggers your asthma, take your rescue medication  15 minutes before exercise or sports, and  During exercise if you have asthma symptoms  Spacer to use with inhaler: If you have a spacer, make sure to use it with your inhaler             YELLOW ZONE Getting Worse  I have ANY of these:  I do not feel good  Cough or wheeze  Chest feels tight  Wake up at night   Keep taking your Green Zone medications  Start taking your rescue medicine:  every 20 minutes for up to 1 hour. Then every 4 hours for 24-48 hours.  If you stay in the Yellow Zone for more than 12-24 hours, contact your doctor.  If you do not return to the Green Zone in 12-24 hours or you get worse, start taking your oral steroid medicine if prescribed by your provider.           RED ZONE Medical Alert - Get Help  I have ANY of these:  I feel awful  Medicine is not helping  Breathing getting harder  Trouble walking or talking  Nose opens wide to breathe       Take your rescue medicine NOW  If your provider has prescribed an oral steroid medicine, start taking it NOW  Call your doctor NOW  If you are still in the Red Zone after 20 minutes and you have not reached your doctor:  Take your rescue medicine again and  Call 911 or go to the emergency room right away    See your regular doctor within 2 weeks of an Emergency Room or Urgent Care visit  for follow-up treatment.          Annual Reminders:  Meet with Asthma Educator,  Flu Shot in the Fall, consider Pneumonia Vaccination for patients with asthma (aged 19 and older).    Pharmacy:    [id number] - [place], [place] - [address] [email or web address] AT [facility] [id number] [facility] [employer] - [place], [place] - [address]    Electronically signed by Anjali Navarro MD   Date: 12/17/24                    Asthma Triggers  How To Control Things That Make Your Asthma Worse    Triggers are things that make your asthma worse.  Look at the list below to help you find your triggers and   what you can do about them. You can help prevent asthma flare-ups by staying away from your triggers.      Trigger                                                          What you can do   Cigarette Smoke  Tobacco smoke can make asthma worse. Do not allow smoking in your home, car or around you.  Be sure no one smokes at a child s day care or school.  If you smoke, ask your health care provider for ways to help you quit.  Ask family members to quit too.  Ask your health care provider for a referral to Quit Plan to help you quit smoking, or call 0-377-282-PLAN.     Colds, Flu, Bronchitis  These are common triggers of asthma. Wash your hands often.  Don t touch your eyes, nose or mouth.  Get a flu shot every year.     Dust Mites  These are tiny bugs that live in cloth or carpet. They are too small to see. Wash sheets and blankets in hot water every week.   Encase pillows and mattress in dust mite proof covers.  Avoid having carpet if you can. If you have carpet, vacuum weekly.   Use a dust mask and HEPA vacuum.   Pollen and Outdoor Mold  Some people are allergic to trees, grass, or weed pollen, or molds. Try to keep your windows closed.  Limit time out doors when pollen count is high.   Ask you health care provider about taking medicine during allergy season.     Animal Dander  Some people are  allergic to skin flakes, urine or saliva from pets with fur or feathers. Keep pets with fur or feathers out of your home.    If you can t keep the pet outdoors, then keep the pet out of your bedroom.  Keep the bedroom door closed.  Keep pets off cloth furniture and away from stuffed toys.     Mice, Rats, and Cockroaches  Some people are allergic to the waste from these pests.   Cover food and garbage.  Clean up spills and food crumbs.  Store grease in the refrigerator.   Keep food out of the bedroom.   Indoor Mold  This can be a trigger if your home has high moisture. Fix leaking faucets, pipes, or other sources of water.   Clean moldy surfaces.  Dehumidify basement if it is damp and smelly.   Smoke, Strong Odors, and Sprays  These can reduce air quality. Stay away from strong odors and sprays, such as perfume, powder, hair spray, paints, smoke incense, paint, cleaning products, candles and new carpet.   Exercise or Sports  Some people with asthma have this trigger. Be active!  Ask your doctor about taking medicine before sports or exercise to prevent symptoms.    Warm up for 5-10 minutes before and after sports or exercise.     Other Triggers of Asthma  Cold air:  Cover your nose and mouth with a scarf.  Sometimes laughing or crying can be a trigger.  Some medicines and food can trigger asthma.

## 2024-12-17 NOTE — PROGRESS NOTES
"Preventive Care Visit  [facility]  Anjali Navarro MD, Family Medicine  Dec 17, 2024      Assessment & Plan     Encounter for Medicare annual wellness exam    Intermittent asthma with reliever use up to twice per week, uncomplicated  Well-controlled on as needed  - albuterol (PROAIR HFA/PROVENTIL HFA/VENTOLIN HFA) 108 (90 Base) MCG/ACT inhaler  Dispense: 8.5 g; Refill: 5    Iron deficiency anemia, unspecified iron deficiency anemia type  Monitor with and evaluate efficacy of  - CBC with platelets    Pre-diabetes  Yearly check:  - Hemoglobin A1c    Excessive cerumen in both ear canals  - REMOVE IMPACTED CERUMEN    Menopause  Osteopenia of multiple sites  - DX Bone Density          BMI  Estimated body mass index is 36.74 kg/m  as calculated from the following:    Height as of this encounter: 1.511 m (4' 11.5\").    Weight as of this encounter: 83.9 kg (185 lb).   Weight management plan: Patient is not inclined to exercise but does exercise some.  She does not always eat healthfully .  She has an ILS worker but they can only go so far in encouraging healthy activities      Return in about 1 year (around 12/17/2025) for medicare annual wellness, or earlier as needed.    Counseling  Appropriate preventive services were addressed with this patient via screening, questionnaire, or discussion as appropriate for fall prevention, nutrition, physical activity, , social engagement, weight loss .  Checklist reviewing preventive services available has been given to the patient.  Reviewed patient's diet, addressing concerns and/or questions.   She is at risk for lack of exercise and has been provided with information to increase physical activity for the benefit of her well-being.   Updated plan of care.  Patient reported difficulty with activities of daily living were addressed today.she has an ILS worker who helps her with these needs      Subjective   Mona is a 74 year old, presenting for the " following:  Physical (Annual check up - wellness)        12/17/2024     9:02 AM   Additional Questions   Roomed by Nessa CERVANTES RN   Accompanied by Guillermina - independent living  - ILS worker.         12/17/2024   Forms   Any forms needing to be completed Yes              HPI  Mona comes today with her ILS worker    Intermittent asthma - uses albuterol as needed.  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  Based on my long-term interactions with Mona around medical issues, I am not clear that she would understand the nuance of an advanced directive, and we do not have anyone listed as agreed      12/17/2024   General Health   How would you rate your overall physical health? Good   Feel stress (tense, anxious, or unable to sleep) Patient declined            12/17/2024   Nutrition   Diet: Regular (no restrictions)            12/17/2024   Exercise   Days per week of moderate/strenuous exercise 2 days      (!) EXERCISE CONCERN      12/17/2024   Social Factors   Frequency of gathering with friends or relatives Patient declined   Worry food won't last until get money to buy more No   Food not last or not have enough money for food? No   Do you have housing? (Housing is defined as stable permanent housing and does not include staying outside in a car, in a tent, in an abandoned building, in an overnight shelter, or couch-surfing.) Yes   Are you worried about losing your housing? No   Lack of transportation? No   Unable to get utilities (heat,electricity)? No    She goes to activities  with other client through [employer]        12/17/2024   Fall Risk    Fallen 2 or more times in the past year? No    Trouble with walking or balance? No        Patient-reported          12/17/2024   Activities of Daily Living- Home Safety   Needs help with the following daily activities Preparing meals   Safety concerns in the home None of the above   She can cook certain things - ILS will make food for her - or she goes somewhere an eats         12/17/2024   Dental   Dentist two times every year? Yes            12/17/2024   Hearing Screening   Hearing concerns? (!) I NEED TO ASK PEOPLE TO SPEAK UP OR REPEAT THEMSELVES.            12/17/2024   Driving Risk Screening   Patient/family members have concerns about driving (!) DECLINE            12/17/2024   General Alertness/Fatigue Screening   Have you been more tired than usual lately? (!) DECLINE            12/17/2024   Urinary Incontinence Screening   Bothered by leaking urine in past 6 months No            12/17/2024   TB Screening   Were you born outside of the [place]? No            Today's PHQ-2 Score:       12/17/2024     8:58 AM   PHQ-2 ( 1999 Pf[employer])   Q1: Little interest or pleasure in doing things 0    Q2: Feeling down, depressed or hopeless 0    PHQ-2 Score 0    Q1: Little interest or pleasure in doing things Not at all   Q2: Feeling down, depressed or hopeless Not at all   PHQ-2 Score 0       Patient-reported           12/17/2024   Substance Use   Alcohol more than 3/day or more than 7/wk No   Do you have a current opioid prescription? No   How severe/bad is pain from 1 to 10? 0/10 (No Pain)   Do you use any other substances recreationally? No        Social History     Tobacco Use    Smoking status: Never    Smokeless tobacco: Never   Substance Use Topics    Alcohol use: No    Drug use: No           8/20/2024   LAST FHS-7 RESULTS   1st degree relative breast or ovarian cancer Unknown   Any relative bilateral breast cancer Unknown   Any male have breast cancer Unknown   Any ONE woman have BOTH breast AND ovarian cancer Unknown    Any woman with breast cancer before 50yrs Unknown   2 or more relatives with breast AND/OR ovarian cancer Unknown   2 or more relatives with breast AND/OR bowel cancer Unknown   We don't know much family history     Mammogram Screening -yearly screened due to unknown family history    ASCVD Risk   The 10-year ASCVD risk score (Dafne CHUNG, et al., 2019) is: 10.7%    Values used to calculate the score:      Age: 74 years      Sex: Female      Is Non- : Yes      Diabetic: No      Tobacco smoker: No      Systolic Blood Pressure: 128 mmHg      Is BP treated: No      HDL Cholesterol: 53 mg/dL      Total Cholesterol: 155 mg/dL            Reviewed and updated as needed this visit by Provider                      Current providers sharing in care for this patient include:  Patient Care Team:  Anjali Navarro MD as PCP - Nadine Carrasquillo as Lead Care Coordinator (Primary Care - CC)  Anjali Navarro MD as Assigned PCP    The following health maintenance items are reviewed in Epic and correct as of today:  Health Maintenance   Topic Date Due    DEXA  01/23/2025    ASTHMA CONTROL TEST  06/17/2025    MAMMO SCREENING  08/20/2025    MEDICARE ANNUAL WELLNESS VISIT  12/17/2025    ANNUAL REVIEW OF HM ORDERS  12/17/2025    ASTHMA ACTION PLAN  12/17/2025    FALL RISK ASSESSMENT  12/17/2025    LIPID  01/18/2027    COLORECTAL CANCER SCREENING  08/08/2027    ADVANCE CARE PLANNING  12/15/2028    DTAP/TDAP/TD IMMUNIZATION (5 - Td or Tdap) 09/15/2030    HEPATITIS C SCREENING  Completed    PHQ-2 (once per calendar year)  Completed    INFLUENZA VACCINE  Completed    Pneumococcal Vaccine: 65+ Years  Completed    ZOSTER IMMUNIZATION  Completed    RSV VACCINE  Completed    COVID-19 Vaccine  Completed    HPV IMMUNIZATION  Aged Out    MENINGITIS IMMUNIZATION  Aged Out    RSV MONOCLONAL ANTIBODY  Aged Out    GLUCOSE  Discontinued            Objective    Exam  /72 (BP Location: Left arm, Patient Position:  "Sitting, Cuff Size: Adult Regular)   Pulse 61   Temp 97.1  F (36.2  C) (Tympanic)   Resp 15   Ht 1.511 m (4' 11.5\")   Wt 83.9 kg (185 lb)   SpO2 97%   BMI 36.74 kg/m     Estimated body mass index is 36.74 kg/m  as calculated from the following:    Height as of this encounter: 1.511 m (4' 11.5\").    Weight as of this encounter: 83.9 kg (185 lb).    Physical Exam  General appearance - alert, well appearing, and in no distress  Mental status - normal mood, behavior, speech, dress, motor activity, Mona can answer simple questions but has a harder time with more complex medical questions  Eyes - deferred - recent exam  Ears - bilateral TM's and external ear canals normal  Mouth - mucous membranes moist, pharynx normal without lesions  Neck - supple, no significant adenopathy, carotids upstroke normal bilaterally, no bruits, thyroid exam: thyroid is normal in size without nodules or tenderness  Chest - clear to auscultation, no wheezes, rales or rhonchi, symmetric air entry  Heart - normal rate, regular rhythm, normal S1, S2, no murmurs, rubs, clicks or gallops  Abdomen - soft, nontender, nondistended, no masses or organomegaly  Breasts - breasts appear normal, no suspicious masses, no skin or nipple changes or axillary nodes  Neurological - alert, oriented, normal speech, no focal findings or movement disorder noted, DTR's normal and symmetric  Extremities - peripheral pulses normal, no pedal edema, no clubbing or cyanosis  Skin - no rashes or worrisome lesions          12/17/2024   Mini Cog   Clock Draw Score 0 Abnormal   3 Item Recall 1 object recalled   Mini Cog Total Score 1                 Signed Electronically by: Anjali Navarro MD    "

## 2024-12-17 NOTE — PATIENT INSTRUCTIONS
Patient Education   Preventive Care Advice   This is general advice given by our system to help you stay healthy. However, your care team may have specific advice just for you. Please talk to your care team about your preventive care needs.  Nutrition  Eat 5 or more servings of fruits and vegetables each day.  Try wheat bread, brown rice and whole grain pasta (instead of white bread, rice, and pasta).  Get enough calcium and vitamin D. Check the label on foods and aim for 100% of the RDA (recommended daily allowance).  Lifestyle  Exercise at least 150 minutes each week  (30 minutes a day, 5 days a week).  Do muscle strengthening activities 2 days a week. These help control your weight and prevent disease.  No smoking.  Wear sunscreen to prevent skin cancer.  Have a dental exam and cleaning every 6 months.  Yearly exams  See your health care team every year to talk about:  Any changes in your health.  Any medicines your care team has prescribed.  Preventive care, family planning, and ways to prevent chronic diseases.  Shots (vaccines)   HPV shots (up to age 26), if you've never had them before.  Hepatitis B shots (up to age 59), if you've never had them before.  COVID-19 shot: Get this shot when it's due.  Flu shot: Get a flu shot every year.  Tetanus shot: Get a tetanus shot every 10 years.  Pneumococcal, hepatitis A, and RSV shots: Ask your care team if you need these based on your risk.  Shingles shot (for age 50 and up)  General health tests  Diabetes screening:  Starting at age 35, Get screened for diabetes at least every 3 years.  If you are younger than age 35, ask your care team if you should be screened for diabetes.  Cholesterol test: At age 39, start having a cholesterol test every 5 years, or more often if advised.  Bone density scan (DEXA): At age 50, ask your care team if you should have this scan for osteoporosis (brittle bones).  Hepatitis C: Get tested at least once in your life.  STIs (sexually  transmitted infections)  Before age 24: Ask your care team if you should be screened for STIs.  After age 24: Get screened for STIs if you're at risk. You are at risk for STIs (including HIV) if:  You are sexually active with more than one person.  You don't use condoms every time.  You or a partner was diagnosed with a sexually transmitted infection.  If you are at risk for HIV, ask about PrEP medicine to prevent HIV.  Get tested for HIV at least once in your life, whether you are at risk for HIV or not.  Cancer screening tests  Cervical cancer screening: If you have a cervix, begin getting regular cervical cancer screening tests starting at age 21.  Breast cancer scan (mammogram): If you've ever had breasts, begin having regular mammograms starting at age 40. This is a scan to check for breast cancer.  Colon cancer screening: It is important to start screening for colon cancer at age 45.  Have a colonoscopy test every 10 years (or more often if you're at risk) Or, ask your provider about stool tests like a FIT test every year or Cologuard test every 3 years.  To learn more about your testing options, visit:   .  For help making a decision, visit:   https://bit.ly/tt39702.  Prostate cancer screening test: If you have a prostate, ask your care team if a prostate cancer screening test (PSA) at age 55 is right for you.  Lung cancer screening: If you are a current or former smoker ages 50 to 80, ask your care team if ongoing lung cancer screenings are right for you.  For informational purposes only. Not to replace the advice of your health care provider. Copyright   2023 [facility]. All rights reserved. Clinically reviewed by the [facility]. Spins.FM 518127 - REV 01/24.  Learning About Activities of Daily Living  What are activities of daily living?     Activities of daily living (ADLs) are the basic self-care tasks you do every day. These include eating, bathing, dressing,  and moving around.  As you age, and if you have health problems, you may find that it's harder to do some of these tasks. If so, your doctor can suggest ideas that may help.  To measure what kind of help you may need, your doctor will ask how well you are able to do ADLs. Let your doctor know if there are any tasks that you are having trouble doing. This is an important first step to getting help. And when you have the help you need, you can stay as independent as possible.  How will a doctor assess your ADLs?  Asking about ADLs is part of a routine health checkup your doctor will likely do as you age. Your health check might be done in a doctor's office, in your home, or at a hospital. The goal is to find out if you are having any problems that could make it hard to care for yourself or that make it unsafe for you to be on your own.  To measure your ADLs, your doctor will ask how hard it is for you to do routine tasks. Your doctor may also want to know if you have changed the way you do a task because of a health problem. Your doctor may watch how you:  Walk back and forth.  Keep your balance while you stand or walk.  Move from sitting to standing or from a bed to a chair.  Button or unbutton a shirt or sweater.  Remove and put on your shoes.  It's common to feel a little worried or anxious if you find you can't do all the things you used to be able to do. Talking with your doctor about ADLs is a way to make sure you're as safe as possible and able to care for yourself as well as you can. You may want to bring a caregiver, friend, or family member to your checkup. They can help you talk to your doctor.  Follow-up care is a key part of your treatment and safety. Be sure to make and go to all appointments, and call your doctor if you are having problems. It's also a good idea to know your test results and keep a list of the medicines you take.  Current as of: October 24, 2023  Content Version: 14.2 2024 [facility]  PATHSENSORS.   Care instructions adapted under license by your healthcare professional. If you have questions about a medical condition or this instruction, always ask your healthcare professional. [employer] disclaims any warranty or liability for your use of this information.    Hearing Loss: Care Instructions  Overview     Hearing loss is a sudden or slow decrease in how well you hear. It can range from slight to profound. Permanent hearing loss can occur with aging. It also can happen when you are exposed long-term to loud noise. Examples include listening to loud music, riding motorcycles, or being around other loud machines.  Hearing loss can affect your work and home life. It can make you feel lonely or depressed. You may feel that you have lost your independence. But hearing aids and other devices can help you hear better and feel connected to others.  Follow-up care is a key part of your treatment and safety. Be sure to make and go to all appointments, and call your doctor if you are having problems. It's also a good idea to know your test results and keep a list of the medicines you take.  How can you care for yourself at home?  Avoid loud noises whenever possible. This helps keep your hearing from getting worse.  Always wear hearing protection around loud noises.  Wear a hearing aid as directed.  A professional can help you pick a hearing aid that will work best for you.  You can also get hearing aids over the counter for mild to moderate hearing loss.  Have hearing tests as your doctor suggests. They can show whether your hearing has changed. Your hearing aid may need to be adjusted.  Use other devices as needed. These may include:  Telephone amplifiers and hearing aids that can connect to a television, stereo, radio, or microphone.  Devices that use lights or vibrations. These alert you to the doorbell, a ringing telephone, or a baby monitor.  Television closed-captioning. This shows  "the words at the bottom of the screen. Most new TVs can do this.  TTY (text telephone). This lets you type messages back and forth on the telephone instead of talking or listening. These devices are also called TDD. When messages are typed on the keyboard, they are sent over the phone line to a receiving TTY. The message is shown on a monitor.  Use text messaging, social media, and email if it is hard for you to communicate by telephone.  Try to learn a listening technique called speechreading. It is not lipreading. You pay attention to people's gestures, expressions, posture, and tone of voice. These clues can help you understand what a person is saying. Face the person you are talking to, and have them face you. Make sure the lighting is good. You need to see the other person's face clearly.  Think about counseling if you need help to adjust to your hearing loss.  When should you call for help?  Watch closely for changes in your health, and be sure to contact your doctor if:    You think your hearing is getting worse.     You have new symptoms, such as dizziness or nausea.   Where can you learn more?  Go to https://www.[employer].net/patiented  Enter R798 in the search box to learn more about \"Hearing Loss: Care Instructions.\"  Current as of: September 27, 2023  Content Version: 14.2 2024 [employer].   Care instructions adapted under license by your healthcare professional. If you have questions about a medical condition or this instruction, always ask your healthcare professional. [employer] disclaims any warranty or liability for your use of this information.       "

## 2024-12-19 ENCOUNTER — PATIENT OUTREACH (OUTPATIENT)
Dept: CARE COORDINATION | Facility: CLINIC | Age: 74
End: 2024-12-19
Payer: COMMERCIAL

## 2024-12-19 DIAGNOSIS — D72.819 LEUKOPENIA, UNSPECIFIED TYPE: Primary | ICD-10-CM

## 2025-02-13 ENCOUNTER — TELEPHONE (OUTPATIENT)
Dept: FAMILY MEDICINE | Facility: CLINIC | Age: 75
End: 2025-02-13
Payer: COMMERCIAL

## 2025-02-13 NOTE — TELEPHONE ENCOUNTER
Called Mona's ILS worker to remind her to schedule a lab appointment for Mona to do a repeat CBC as her last white count was low

## 2025-02-20 ENCOUNTER — DOCUMENTATION ONLY (OUTPATIENT)
Dept: LAB | Facility: CLINIC | Age: 75
End: 2025-02-20
Payer: COMMERCIAL

## 2025-02-20 NOTE — PROGRESS NOTES
I took out the order for the CBC because my understanding was that she was now being followed by another doctor through her adult home - this was a series of phone calls with her sister.  She has been long overdue for diabetes Follow up ad overdue for annual wellness - this is why I called her sister.  PLEASE call her adult home to clarify.  If [facility] no longer her doctor, I no longer want her to have labs with me If I AM her doctor , she MUST come in for a visit, preferably annual wellness, and we will do the labs then

## 2025-02-20 NOTE — PROGRESS NOTES
Mona Barnett has an upcoming lab appointment:    Future Appointments   Date Time Provider Department Center   2/24/2025  9:30 AM [facility]     Patient is scheduled for the following lab(s): CBC    There is no order available. Please review and place either future orders or HMPO (Review of Health Maintenance Protocol Orders), as appropriate.    There are no preventive care reminders to display for this patient.  Waldo Ordoñez     home care/Home Attendant

## 2025-02-21 NOTE — PROGRESS NOTES
I called and [facility] for MOHAN Sarmiento  for patient.  If Guillermina calls back please clarify whether [facility] is following the patient, or if Dr. Navarro is going to be following as PCP.   Dr. Navarro understood that the patient was seeking care at [facility].   Please assist in scheduling for appointment AND lab if Dr. Navarro will resume care as the patient's PCP.

## 2025-02-24 DIAGNOSIS — D72.819 LEUKOPENIA, UNSPECIFIED TYPE: Primary | ICD-10-CM

## 2025-02-24 NOTE — PROGRESS NOTES
I am SO sorry - I got this Mona mixed up with another Mona. I called Guillermina and let her know.

## 2025-02-24 NOTE — PROGRESS NOTES
Spoke with Guillermina who states the patient is NOT seeing any other doctors. States she will call back and schedule appointment when she knows when she will be seeing the patient next week.

## 2025-03-10 ENCOUNTER — LAB (OUTPATIENT)
Dept: LAB | Facility: CLINIC | Age: 75
End: 2025-03-10
Payer: COMMERCIAL

## 2025-03-10 DIAGNOSIS — D72.819 LEUKOPENIA, UNSPECIFIED TYPE: ICD-10-CM

## 2025-03-10 LAB
ERYTHROCYTE [DISTWIDTH] IN BLOOD BY AUTOMATED COUNT: 16.5 % (ref 10–15)
HCT VFR BLD AUTO: 38.4 % (ref 35–47)
HGB BLD-MCNC: 11.5 G/DL (ref 11.7–15.7)
MCH RBC QN AUTO: 24.2 PG (ref 26.5–33)
MCHC RBC AUTO-ENTMCNC: 29.9 G/DL (ref 31.5–36.5)
MCV RBC AUTO: 81 FL (ref 78–100)
PLATELET # BLD AUTO: 347 10E3/UL (ref 150–450)
RBC # BLD AUTO: 4.76 10E6/UL (ref 3.8–5.2)
WBC # BLD AUTO: 5.6 10E3/UL (ref 4–11)

## 2025-03-10 PROCEDURE — 36415 COLL VENOUS BLD VENIPUNCTURE: CPT

## 2025-03-10 PROCEDURE — 85027 COMPLETE CBC AUTOMATED: CPT

## 2025-05-19 ENCOUNTER — PATIENT OUTREACH (OUTPATIENT)
Dept: GERIATRIC MEDICINE | Facility: CLINIC | Age: 75
End: 2025-05-19
Payer: COMMERCIAL

## 2025-05-19 NOTE — PROGRESS NOTES
[facility] Care Coordination Contact      [facility] Six-Month Telephone Assessment    6 month telephone assessment completed on 5/19/25.    ER visits: No  Hospitalizations: No  TCU stays: No  Significant health status changes: Mona reports her health has been stable.   Falls/Injuries: No  ADL/IADL changes: No  Changes in services: No    Caregiver Assessment follow up:  N/A    It was extremely difficult to arrange a time to speak with Mona. I attempted over five times to call her and left messages for her ILS worker Guillermina. We were finally able to connect and arrange a time for today.   Guillermina will program my number in Mona's phone to hopefully allow for to  my calls in the future. Guillermina isn't sure how long she will continue to work with Mona as her schedule has been very busy.     Goals: See Support Plan for goal progress documentation.      Will see member in 6 months for an annual health risk assessment.   Encouraged member to call CC with any questions or concerns in the meantime.       BALJINDER Gonsales, Manager   [facility]  [phone]

## 2025-06-24 ENCOUNTER — ANCILLARY PROCEDURE (OUTPATIENT)
Dept: BONE DENSITY | Facility: CLINIC | Age: 75
End: 2025-06-24
Attending: FAMILY MEDICINE
Payer: COMMERCIAL

## 2025-06-24 DIAGNOSIS — M85.89 OSTEOPENIA OF MULTIPLE SITES: ICD-10-CM

## 2025-06-24 DIAGNOSIS — Z78.0 MENOPAUSE: ICD-10-CM

## 2025-06-24 PROCEDURE — 77080 DXA BONE DENSITY AXIAL: CPT | Mod: TC | Performed by: PHYSICIAN ASSISTANT

## 2025-06-24 PROCEDURE — 77091 TBS TECHL CALCULATION ONLY: CPT | Performed by: PHYSICIAN ASSISTANT

## 2025-06-24 PROCEDURE — 77081 DXA BONE DENSITY APPENDICULR: CPT | Mod: TC | Performed by: PHYSICIAN ASSISTANT

## 2025-06-26 ENCOUNTER — RESULTS FOLLOW-UP (OUTPATIENT)
Dept: INTERNAL MEDICINE | Facility: CLINIC | Age: 75
End: 2025-06-26
Payer: COMMERCIAL

## 2025-06-26 NOTE — RESULT ENCOUNTER NOTE
Called Guillermina and let her know that Dr Navarro did send a letter to [facility] regarding her bone density results.  She thanked me for calling her. No further questions

## 2025-06-26 NOTE — LETTER
June 26, 2025      Mona Barnett  [address]   [place] [address]        Dear Mona,    I am writing about your bone density results, at the end of this letter.    You have osteopenia - low bone density, but not low enough to start medication.  Weight bearing exercise, and adequate calcium (aim for about 1000 mg daily from diet and or supplements)  and vitamin D intake (probalby about 1000 iu daily)  may help this from progressing.     Calcium will mostly come from dairy products (milk, yogurt, cheese) but there are some vegetables that have calcium too like bok dean, Chinese mustard greens and chinese cabbage flower leaves.  Vitamin D is added to dairy products, can be found in some fish like salmon and sardines, and your body makes some in response to sunlight - which is somewhat lacking in [place]!     We should recheck your DEXA in two years.    If you have any questions or concerns, please call the clinic at the number listed above.       Sincerely,      Anjali Navarro MD    Electronically signed        Resulted Orders   DX Bone Density    Narrative    EXAM: DX TBS AXIAL and PERIPHERAL  LOCATION: [facility]  DATE: 6/24/2025    INDICATION: BMD Screening. Follow up.  DEMOGRAPHICS: Age- 74 years. Gender- Female. Menopausal status- Postmenopausal.  COMPARISON: 01/23/2023.  TECHNIQUE: Dual-energy x-ray absorptiometry (DXA) performed with routine technique. Forearm DXA performed.  Trabecular bone score (TBS) analysis performed.    FINDINGS:    DXA RESULTS  -Lumbar Spine: L1-L4: BMD: 1.162 g/cm2. T-score: -0.1. Z-score: 0.9. Degenerative change may artifactually increase BMD.  -RIGHT Hip Total: BMD: 0.812 g/cm2. T-score: -1.5. Z-score: -0.9.  -RIGHT Hip Femoral neck: BMD: 0.843 g/cm2. T-score: -1.4. Z-score: -0.5.  -LEFT Hip Total: BMD: 0.797 g/cm2. T-score: -1.7. Z-score: -1.0.  -LEFT Hip Femoral neck: BMD: 0.723 g/cm2. T-score: -2.3. Z-score: -1.4.  -LEFT Radius 33%: BMD: 0.716  g/cm2. T-score: -1.8. Z-score: -0.5.    WHO T-SCORE CRITERIA  -Normal: T score at or above -1 SD  -Osteopenia: T score between -1 and -2.5 SD  -Osteoporosis: T score at or below -2.5 SD    The World Health Organization (WHO) criteria is applicable to perimenopausal females, postmenopausal females, and men aged 50 years or older.    TBS RESULTS  -Lumbar Spine L1-L4: TBS: 1.374. TBS T-score: -0.3.TBS Z-score: 1.6.    The TBS is a DXA derived measurement for fracture risk assessment, and reflects the structural condition of the bone microarchitecture. It can be used to adjust WHO Fracture Risk Assessment Tool (FRAX) probability of fracture in postmenopausal women and   older men. The calculated probabilities of fracture have been shown to be more accurate when computed with the TBS.    INTERVAL CHANGE  -There has been a 3.8% increase in lumbar spine BMD.  -There has been a 0.9% decrease in bilateral hip BMD.  -The forearm was not previously measured.    FRACTURE RISK  -FRAX Results: The 10 year probability of major osteoporotic fracture is 5.9%, and of hip fracture is 1.4%, based on left femoral neck BMD.  -TBS-adjusted FRAX Results: The 10 year probability of major osteoporotic fracture is 5.7%, and of hip fracture is 1.4%.    RECOMMENDATIONS  Consider treatment if major osteoporotic fracture score is greater than or equal to 20%, and if the hip fracture score is greater than or equal to 3%.      Impression    IMPRESSION: Low bone density (OSTEOPENIA). T score meets the WHO criteria for low bone density (osteopenia) at one or more measured sites. The risk of osteoporotic fracture increases approximately two-fold for each standard deviation decrease in T-score.

## 2025-07-21 ENCOUNTER — PATIENT OUTREACH (OUTPATIENT)
Dept: CARE COORDINATION | Facility: CLINIC | Age: 75
End: 2025-07-21
Payer: COMMERCIAL

## 2025-08-26 ENCOUNTER — ANCILLARY PROCEDURE (OUTPATIENT)
Dept: MAMMOGRAPHY | Facility: CLINIC | Age: 75
End: 2025-08-26
Attending: FAMILY MEDICINE
Payer: COMMERCIAL

## 2025-08-26 DIAGNOSIS — Z12.31 VISIT FOR SCREENING MAMMOGRAM: ICD-10-CM

## 2025-08-26 PROCEDURE — 77067 SCR MAMMO BI INCL CAD: CPT | Mod: TC | Performed by: STUDENT IN AN ORGANIZED HEALTH CARE EDUCATION/TRAINING PROGRAM

## 2025-08-26 PROCEDURE — 77063 BREAST TOMOSYNTHESIS BI: CPT | Mod: TC | Performed by: STUDENT IN AN ORGANIZED HEALTH CARE EDUCATION/TRAINING PROGRAM
